# Patient Record
Sex: MALE | Race: WHITE | Employment: FULL TIME
[De-identification: names, ages, dates, MRNs, and addresses within clinical notes are randomized per-mention and may not be internally consistent; named-entity substitution may affect disease eponyms.]

---

## 2017-01-05 RX ORDER — TRAMADOL HYDROCHLORIDE 50 MG/1
TABLET ORAL
Qty: 120 TABLET | Refills: 0 | Status: SHIPPED | OUTPATIENT
Start: 2017-01-05 | End: 2017-04-10 | Stop reason: SDUPTHER

## 2017-01-09 ENCOUNTER — OFFICE VISIT (OUTPATIENT)
Dept: PODIATRY | Facility: CLINIC | Age: 42
End: 2017-01-09

## 2017-01-09 VITALS
HEIGHT: 72 IN | SYSTOLIC BLOOD PRESSURE: 142 MMHG | BODY MASS INDEX: 32.51 KG/M2 | HEART RATE: 79 BPM | DIASTOLIC BLOOD PRESSURE: 93 MMHG | WEIGHT: 240 LBS

## 2017-01-09 DIAGNOSIS — M77.50 ENTHESOPATHY OF ANKLE AND TARSUS: ICD-10-CM

## 2017-01-09 DIAGNOSIS — M25.571 SINUS TARSI SYNDROME OF RIGHT ANKLE: ICD-10-CM

## 2017-01-09 DIAGNOSIS — M72.2 PLANTAR FASCIAL FIBROMATOSIS OF LEFT FOOT: ICD-10-CM

## 2017-01-09 DIAGNOSIS — M25.572 SINUS TARSI SYNDROME OF LEFT ANKLE: ICD-10-CM

## 2017-01-09 DIAGNOSIS — M72.2 PLANTAR FASCIITIS, BILATERAL: Primary | ICD-10-CM

## 2017-01-09 DIAGNOSIS — M79.672 PAIN IN LEFT FOOT: ICD-10-CM

## 2017-01-09 PROCEDURE — 99213 OFFICE O/P EST LOW 20 MIN: CPT | Performed by: PODIATRIST

## 2017-01-09 RX ORDER — INDOMETHACIN 50 MG/1
50 CAPSULE ORAL 3 TIMES DAILY
Qty: 90 CAPSULE | Refills: 2 | Status: SHIPPED | OUTPATIENT
Start: 2017-01-09 | End: 2019-01-28 | Stop reason: ALTCHOICE

## 2017-01-09 ASSESSMENT — ENCOUNTER SYMPTOMS
CONSTIPATION: 0
VOMITING: 0
COLOR CHANGE: 0
BACK PAIN: 1
NAUSEA: 0
DIARRHEA: 0

## 2017-01-26 RX ORDER — TRAMADOL HYDROCHLORIDE 50 MG/1
50 TABLET ORAL EVERY 6 HOURS PRN
Qty: 84 TABLET | Refills: 3 | Status: SHIPPED | OUTPATIENT
Start: 2017-01-26 | End: 2017-05-01 | Stop reason: SDUPTHER

## 2017-02-24 ENCOUNTER — TELEPHONE (OUTPATIENT)
Dept: PODIATRY | Facility: CLINIC | Age: 42
End: 2017-02-24

## 2017-04-11 RX ORDER — TRAMADOL HYDROCHLORIDE 50 MG/1
50 TABLET ORAL EVERY 6 HOURS PRN
Qty: 120 TABLET | Refills: 0 | Status: SHIPPED | OUTPATIENT
Start: 2017-04-11 | End: 2017-07-06 | Stop reason: SDUPTHER

## 2017-05-04 RX ORDER — TRAMADOL HYDROCHLORIDE 50 MG/1
TABLET ORAL
Qty: 84 TABLET | Refills: 2 | Status: SHIPPED | OUTPATIENT
Start: 2017-05-04 | End: 2017-07-06 | Stop reason: SDUPTHER

## 2017-07-06 RX ORDER — TRAMADOL HYDROCHLORIDE 50 MG/1
TABLET ORAL
Qty: 84 TABLET | Refills: 1 | Status: SHIPPED | OUTPATIENT
Start: 2017-07-06 | End: 2017-08-10 | Stop reason: SDUPTHER

## 2017-08-14 RX ORDER — TRAMADOL HYDROCHLORIDE 50 MG/1
TABLET ORAL
Qty: 84 TABLET | Refills: 0 | Status: SHIPPED | OUTPATIENT
Start: 2017-08-14 | End: 2017-09-05 | Stop reason: SDUPTHER

## 2017-09-06 RX ORDER — TRAMADOL HYDROCHLORIDE 50 MG/1
TABLET ORAL
Qty: 84 TABLET | Refills: 0 | Status: SHIPPED | OUTPATIENT
Start: 2017-09-06 | End: 2017-09-25 | Stop reason: SDUPTHER

## 2017-09-26 RX ORDER — TRAMADOL HYDROCHLORIDE 50 MG/1
TABLET ORAL
Qty: 84 TABLET | Refills: 0 | Status: SHIPPED | OUTPATIENT
Start: 2017-09-26 | End: 2017-10-16 | Stop reason: SDUPTHER

## 2017-10-17 RX ORDER — TRAMADOL HYDROCHLORIDE 50 MG/1
TABLET ORAL
Qty: 84 TABLET | Refills: 0 | Status: SHIPPED | OUTPATIENT
Start: 2017-10-17 | End: 2017-11-06 | Stop reason: SDUPTHER

## 2017-11-07 RX ORDER — TRAMADOL HYDROCHLORIDE 50 MG/1
TABLET ORAL
Qty: 84 TABLET | Refills: 0 | Status: SHIPPED | OUTPATIENT
Start: 2017-11-07 | End: 2017-11-28 | Stop reason: SDUPTHER

## 2017-11-07 NOTE — TELEPHONE ENCOUNTER
Carson's wife Gabi Montero called to ask why it is taking so long for his pain medication to be approved by Dr. Kanwal Vega. Per Dr. Kanwal Vega, I told her that he can not approve this without running a report to make sure he is not getting any pain medication from anyone else. He will work on this at the end of patients today. He also said that Abby Moreno would need an appointment in January, 2018, so Gabi Montero said Abby Moreno will call to schedule one.

## 2017-11-28 RX ORDER — TRAMADOL HYDROCHLORIDE 50 MG/1
TABLET ORAL
Qty: 84 TABLET | Refills: 0 | Status: SHIPPED | OUTPATIENT
Start: 2017-11-28 | End: 2017-12-18 | Stop reason: SDUPTHER

## 2017-12-19 RX ORDER — TRAMADOL HYDROCHLORIDE 50 MG/1
TABLET ORAL
Qty: 84 TABLET | Refills: 0 | Status: SHIPPED | OUTPATIENT
Start: 2017-12-19 | End: 2018-01-10 | Stop reason: SDUPTHER

## 2018-01-10 DIAGNOSIS — M77.50 ENTHESOPATHY OF ANKLE AND TARSUS: ICD-10-CM

## 2018-01-10 DIAGNOSIS — G89.29 CHRONIC PAIN OF TOES OF BOTH FEET: ICD-10-CM

## 2018-01-10 DIAGNOSIS — M79.675 CHRONIC PAIN OF TOES OF BOTH FEET: ICD-10-CM

## 2018-01-10 DIAGNOSIS — M72.2 PLANTAR FASCIAL FIBROMATOSIS OF LEFT FOOT: ICD-10-CM

## 2018-01-10 DIAGNOSIS — M79.674 CHRONIC PAIN OF TOES OF BOTH FEET: ICD-10-CM

## 2018-01-10 DIAGNOSIS — M72.2 PLANTAR FASCIITIS, BILATERAL: Primary | ICD-10-CM

## 2018-01-11 RX ORDER — TRAMADOL HYDROCHLORIDE 50 MG/1
TABLET ORAL
Qty: 84 TABLET | Refills: 0 | Status: SHIPPED | OUTPATIENT
Start: 2018-01-11 | End: 2018-02-01 | Stop reason: SDUPTHER

## 2018-02-01 ENCOUNTER — OFFICE VISIT (OUTPATIENT)
Dept: PODIATRY | Age: 43
End: 2018-02-01
Payer: COMMERCIAL

## 2018-02-01 VITALS
HEIGHT: 72 IN | BODY MASS INDEX: 32.51 KG/M2 | HEART RATE: 73 BPM | DIASTOLIC BLOOD PRESSURE: 126 MMHG | SYSTOLIC BLOOD PRESSURE: 180 MMHG | WEIGHT: 240 LBS

## 2018-02-01 DIAGNOSIS — M25.572 SINUS TARSI SYNDROME OF LEFT ANKLE: ICD-10-CM

## 2018-02-01 DIAGNOSIS — G89.29 CHRONIC PAIN OF TOES OF BOTH FEET: ICD-10-CM

## 2018-02-01 DIAGNOSIS — M25.571 SINUS TARSI SYNDROME OF RIGHT ANKLE: ICD-10-CM

## 2018-02-01 DIAGNOSIS — M77.50 ENTHESOPATHY OF ANKLE AND TARSUS: ICD-10-CM

## 2018-02-01 DIAGNOSIS — M72.2 PLANTAR FASCIITIS, BILATERAL: Primary | ICD-10-CM

## 2018-02-01 DIAGNOSIS — M72.2 PLANTAR FASCIAL FIBROMATOSIS OF LEFT FOOT: ICD-10-CM

## 2018-02-01 DIAGNOSIS — M79.674 CHRONIC PAIN OF TOES OF BOTH FEET: ICD-10-CM

## 2018-02-01 DIAGNOSIS — M79.675 CHRONIC PAIN OF TOES OF BOTH FEET: ICD-10-CM

## 2018-02-01 PROCEDURE — 99213 OFFICE O/P EST LOW 20 MIN: CPT | Performed by: PODIATRIST

## 2018-02-01 PROCEDURE — 20550 NJX 1 TENDON SHEATH/LIGAMENT: CPT | Performed by: PODIATRIST

## 2018-02-01 RX ORDER — TESTOSTERONE CYPIONATE 200 MG/ML
6 INJECTION INTRAMUSCULAR ONCE
Status: COMPLETED | OUTPATIENT
Start: 2018-02-01 | End: 2018-02-01

## 2018-02-01 RX ORDER — TRAMADOL HYDROCHLORIDE 50 MG/1
TABLET ORAL
Qty: 84 TABLET | Refills: 0 | Status: SHIPPED | OUTPATIENT
Start: 2018-02-01 | End: 2018-02-20 | Stop reason: SDUPTHER

## 2018-02-01 RX ORDER — BUPIVACAINE HYDROCHLORIDE 5 MG/ML
1 INJECTION, SOLUTION PERINEURAL ONCE
Status: COMPLETED | OUTPATIENT
Start: 2018-02-01 | End: 2018-02-01

## 2018-02-01 RX ADMIN — TESTOSTERONE CYPIONATE 6 MG: 200 INJECTION INTRAMUSCULAR at 17:45

## 2018-02-01 RX ADMIN — BUPIVACAINE HYDROCHLORIDE 5 MG: 5 INJECTION, SOLUTION PERINEURAL at 17:45

## 2018-02-01 ASSESSMENT — ENCOUNTER SYMPTOMS
VOMITING: 0
COLOR CHANGE: 0
CONSTIPATION: 0
BACK PAIN: 1
DIARRHEA: 0
NAUSEA: 0

## 2018-02-01 NOTE — PROGRESS NOTES
[facility]  Return Patient     Cash Winter 43 y.o. male that presents for follow-up of   Chief Complaint   Patient presents with    Foot Pain     reassess b/l feet, still having pain      Most of his pain today is in the ankle, side area, lateral ankle. Worse in the left plantar arch, where he had a fibroma. Noticed a bump now on the right. He is taking Ultram, not wearing orthotics-they became painful. .    He has a history of severe foot and ankle pain. He used to wear orthotics everyday and he tries to get new boots. He has been icing. No injury. He works on ballast and uneven surfaces. Currently denies F/C/N/V. No Known Allergies    No past medical history on file. Prior to Admission medications    Medication Sig Start Date End Date Taking? Authorizing Provider   traMADol (ULTRAM) 50 MG tablet TAKE ONE TABLET BY MOUTH EVERY 6 HOURS AS NEEDED FOR PAIN. 2/1/18 3/4/18 Yes Pita Menon, RENETTA   indomethacin (INDOCIN) 50 MG capsule Take 1 capsule by mouth 3 times daily 1/9/17  Yes Mirian Munoz DPM   amLODIPine (NORVASC) 5 MG tablet Take 1 tablet by mouth daily. 1/26/15  Yes Vinod Alva MD       Review of Systems   Constitutional: Negative for chills, diaphoresis, fatigue, fever and unexpected weight change. Cardiovascular: Negative for leg swelling. Gastrointestinal: Negative for constipation, diarrhea, nausea and vomiting. Musculoskeletal: Positive for back pain. Negative for arthralgias, gait problem and joint swelling. Skin: Negative for color change, pallor, rash and wound. Neurological: Negative for weakness and numbness. Lower Extremity Physical Examination:     Vitals:   Vitals:    02/01/18 1622   BP: (!) 180/126   Pulse: 73     General: AAO x 3 in NAD.      Integument:   Warm, dry, supple, Bilateral.  Open lesion absent, Bilateral.      Vascular: DP and PT pulses palpable 2/4, Bilateral.  CFT <3 seconds, Bilateral.  Hair growth present to the level of the digits, Bilateral.  Edema absent, Bilateral.  Varicosities absent, Bilateral. Erythema absent, Bilateral    Neurological:  Sensation present to light touch to level of digits, Bilateral.      Musculoskeletal: Muscle strength 5/5, Bilateral.  Pain present upon palpation of central left arch with a palpable nodule within the plantar fascia, Left, small nodule noted on the right, minimal pain. Ludger Hare decreased medial longitudinal arch, Bilateral.  Ankle ROM normal,Bilateral.  Pain bilateral sinus tarsi. Radiographs: 3 views left Foot:  Decreased medial arch height. No acute pathology. Asessment: Patient is a 43 y.o. male with:   1. Plantar fasciitis, bilateral    2. Enthesopathy of ankle and tarsus    3. Plantar fascial fibromatosis of left foot    4. Sinus tarsi syndrome of right ankle    5. Sinus tarsi syndrome of left ankle    6. Chronic pain of toes of both feet        Plan: Patient examined and evaluated. Current condition and treatment options discussed in detail. Advised pt to continue with orthotics and new boots. Verbal and written instructions given to patient. Contact office with any questions/problems/concerns. 1) Ice, massage, orthotics. 2) continue Ultram  3) Injection Type:  Trigger point/Ligament    Anatomic Location: left central arch, fibroma    Verbal consent obtained from patient for injection after all questions answered. Left foot palpated and most tender location identified. This area was prepped with an alcohol swab and 3 cc total of 1cc 0.5% marcaine plain, 1 cc dexamethasone phosphate, and 1 cc depo medrol was injected to the left. A band-aid was applied, patient advised of possible local steroid reaction symptoms, and patient instructed to limit activities to this area for 24 hours. Medications ordered during this encounter were injected into the left.      Orders Placed This Encounter   Procedures    [place] INJECT TENDON SHEATH/LIGAMENT     Orders Placed This

## 2018-02-06 DIAGNOSIS — L74.513 SWEATY FEET: Primary | ICD-10-CM

## 2018-02-06 RX ORDER — ERYTHROMYCIN 20 MG/G
GEL TOPICAL
Qty: 1 TUBE | Refills: 3 | Status: SHIPPED | OUTPATIENT
Start: 2018-02-06 | End: 2019-01-28

## 2018-02-20 DIAGNOSIS — M79.674 CHRONIC PAIN OF TOES OF BOTH FEET: ICD-10-CM

## 2018-02-20 DIAGNOSIS — G89.29 CHRONIC PAIN OF TOES OF BOTH FEET: ICD-10-CM

## 2018-02-20 DIAGNOSIS — M25.571 SINUS TARSI SYNDROME OF RIGHT ANKLE: ICD-10-CM

## 2018-02-20 DIAGNOSIS — M77.50 ENTHESOPATHY OF ANKLE AND TARSUS: ICD-10-CM

## 2018-02-20 DIAGNOSIS — M72.2 PLANTAR FASCIITIS, BILATERAL: ICD-10-CM

## 2018-02-20 DIAGNOSIS — M25.572 SINUS TARSI SYNDROME OF LEFT ANKLE: ICD-10-CM

## 2018-02-20 DIAGNOSIS — M72.2 PLANTAR FASCIAL FIBROMATOSIS OF LEFT FOOT: ICD-10-CM

## 2018-02-20 DIAGNOSIS — M79.675 CHRONIC PAIN OF TOES OF BOTH FEET: ICD-10-CM

## 2018-02-20 RX ORDER — TRAMADOL HYDROCHLORIDE 50 MG/1
TABLET ORAL
Qty: 84 TABLET | Refills: 0 | Status: SHIPPED | OUTPATIENT
Start: 2018-02-20 | End: 2018-03-12 | Stop reason: SDUPTHER

## 2018-03-12 DIAGNOSIS — M79.674 CHRONIC PAIN OF TOES OF BOTH FEET: ICD-10-CM

## 2018-03-12 DIAGNOSIS — M79.675 CHRONIC PAIN OF TOES OF BOTH FEET: ICD-10-CM

## 2018-03-12 DIAGNOSIS — M77.50 ENTHESOPATHY OF ANKLE AND TARSUS: ICD-10-CM

## 2018-03-12 DIAGNOSIS — M25.572 SINUS TARSI SYNDROME OF LEFT ANKLE: ICD-10-CM

## 2018-03-12 DIAGNOSIS — G89.29 CHRONIC PAIN OF TOES OF BOTH FEET: ICD-10-CM

## 2018-03-12 DIAGNOSIS — M25.571 SINUS TARSI SYNDROME OF RIGHT ANKLE: ICD-10-CM

## 2018-03-12 DIAGNOSIS — M72.2 PLANTAR FASCIAL FIBROMATOSIS OF LEFT FOOT: ICD-10-CM

## 2018-03-12 DIAGNOSIS — M72.2 PLANTAR FASCIITIS, BILATERAL: ICD-10-CM

## 2018-03-14 RX ORDER — TRAMADOL HYDROCHLORIDE 50 MG/1
TABLET ORAL
Qty: 84 TABLET | Refills: 0 | Status: SHIPPED | OUTPATIENT
Start: 2018-03-14 | End: 2018-04-05 | Stop reason: SDUPTHER

## 2018-04-27 ENCOUNTER — TELEPHONE (OUTPATIENT)
Dept: PODIATRY | Age: 43
End: 2018-04-27

## 2018-04-27 DIAGNOSIS — M72.2 PLANTAR FASCIAL FIBROMATOSIS OF LEFT FOOT: ICD-10-CM

## 2018-04-27 DIAGNOSIS — M79.675 CHRONIC PAIN OF TOES OF BOTH FEET: ICD-10-CM

## 2018-04-27 DIAGNOSIS — M25.571 SINUS TARSI SYNDROME OF RIGHT ANKLE: ICD-10-CM

## 2018-04-27 DIAGNOSIS — M79.674 CHRONIC PAIN OF TOES OF BOTH FEET: ICD-10-CM

## 2018-04-27 DIAGNOSIS — M77.50 ENTHESOPATHY OF ANKLE AND TARSUS: ICD-10-CM

## 2018-04-27 DIAGNOSIS — M72.2 PLANTAR FASCIITIS, BILATERAL: ICD-10-CM

## 2018-04-27 DIAGNOSIS — M25.572 SINUS TARSI SYNDROME OF LEFT ANKLE: ICD-10-CM

## 2018-04-27 DIAGNOSIS — G89.29 CHRONIC PAIN OF TOES OF BOTH FEET: ICD-10-CM

## 2018-04-30 RX ORDER — TRAMADOL HYDROCHLORIDE 50 MG/1
50 TABLET ORAL EVERY 6 HOURS PRN
Qty: 84 TABLET | Refills: 0 | Status: SHIPPED | OUTPATIENT
Start: 2018-04-30 | End: 2018-05-21 | Stop reason: SDUPTHER

## 2018-05-21 DIAGNOSIS — M72.2 PLANTAR FASCIAL FIBROMATOSIS OF LEFT FOOT: ICD-10-CM

## 2018-05-21 DIAGNOSIS — M25.572 SINUS TARSI SYNDROME OF LEFT ANKLE: ICD-10-CM

## 2018-05-21 DIAGNOSIS — M72.2 PLANTAR FASCIITIS, BILATERAL: ICD-10-CM

## 2018-05-21 DIAGNOSIS — M25.571 SINUS TARSI SYNDROME OF RIGHT ANKLE: ICD-10-CM

## 2018-05-21 DIAGNOSIS — M77.50 ENTHESOPATHY OF ANKLE AND TARSUS: ICD-10-CM

## 2018-05-21 DIAGNOSIS — M79.674 CHRONIC PAIN OF TOES OF BOTH FEET: ICD-10-CM

## 2018-05-21 DIAGNOSIS — G89.29 CHRONIC PAIN OF TOES OF BOTH FEET: ICD-10-CM

## 2018-05-21 DIAGNOSIS — M79.675 CHRONIC PAIN OF TOES OF BOTH FEET: ICD-10-CM

## 2018-05-23 RX ORDER — TRAMADOL HYDROCHLORIDE 50 MG/1
TABLET ORAL
Qty: 84 TABLET | Refills: 0 | Status: SHIPPED | OUTPATIENT
Start: 2018-05-23 | End: 2018-06-11 | Stop reason: SDUPTHER

## 2018-06-11 DIAGNOSIS — G89.29 CHRONIC PAIN OF TOES OF BOTH FEET: ICD-10-CM

## 2018-06-11 DIAGNOSIS — M77.50 ENTHESOPATHY OF ANKLE AND TARSUS: ICD-10-CM

## 2018-06-11 DIAGNOSIS — M25.572 SINUS TARSI SYNDROME OF LEFT ANKLE: ICD-10-CM

## 2018-06-11 DIAGNOSIS — M79.675 CHRONIC PAIN OF TOES OF BOTH FEET: ICD-10-CM

## 2018-06-11 DIAGNOSIS — M79.674 CHRONIC PAIN OF TOES OF BOTH FEET: ICD-10-CM

## 2018-06-11 DIAGNOSIS — M25.571 SINUS TARSI SYNDROME OF RIGHT ANKLE: ICD-10-CM

## 2018-06-11 DIAGNOSIS — M72.2 PLANTAR FASCIAL FIBROMATOSIS OF LEFT FOOT: ICD-10-CM

## 2018-06-11 DIAGNOSIS — M72.2 PLANTAR FASCIITIS, BILATERAL: ICD-10-CM

## 2018-06-11 RX ORDER — TRAMADOL HYDROCHLORIDE 50 MG/1
TABLET ORAL
Qty: 84 TABLET | Refills: 0 | Status: SHIPPED | OUTPATIENT
Start: 2018-06-11 | End: 2018-07-06 | Stop reason: SDUPTHER

## 2018-07-06 DIAGNOSIS — M25.572 SINUS TARSI SYNDROME OF LEFT ANKLE: ICD-10-CM

## 2018-07-06 DIAGNOSIS — G89.29 CHRONIC PAIN OF TOES OF BOTH FEET: ICD-10-CM

## 2018-07-06 DIAGNOSIS — M77.50 ENTHESOPATHY OF ANKLE AND TARSUS: ICD-10-CM

## 2018-07-06 DIAGNOSIS — M72.2 PLANTAR FASCIITIS, BILATERAL: ICD-10-CM

## 2018-07-06 DIAGNOSIS — M25.571 SINUS TARSI SYNDROME OF RIGHT ANKLE: ICD-10-CM

## 2018-07-06 DIAGNOSIS — M72.2 PLANTAR FASCIAL FIBROMATOSIS OF LEFT FOOT: ICD-10-CM

## 2018-07-06 DIAGNOSIS — M79.675 CHRONIC PAIN OF TOES OF BOTH FEET: ICD-10-CM

## 2018-07-06 DIAGNOSIS — M79.674 CHRONIC PAIN OF TOES OF BOTH FEET: ICD-10-CM

## 2018-07-09 RX ORDER — TRAMADOL HYDROCHLORIDE 50 MG/1
TABLET ORAL
Qty: 84 TABLET | Refills: 0 | Status: SHIPPED | OUTPATIENT
Start: 2018-07-09 | End: 2018-07-31 | Stop reason: SDUPTHER

## 2018-07-31 DIAGNOSIS — M25.571 SINUS TARSI SYNDROME OF RIGHT ANKLE: ICD-10-CM

## 2018-07-31 DIAGNOSIS — M79.675 CHRONIC PAIN OF TOES OF BOTH FEET: ICD-10-CM

## 2018-07-31 DIAGNOSIS — M79.674 CHRONIC PAIN OF TOES OF BOTH FEET: ICD-10-CM

## 2018-07-31 DIAGNOSIS — M77.50 ENTHESOPATHY OF ANKLE AND TARSUS: ICD-10-CM

## 2018-07-31 DIAGNOSIS — M72.2 PLANTAR FASCIAL FIBROMATOSIS OF LEFT FOOT: ICD-10-CM

## 2018-07-31 DIAGNOSIS — G89.29 CHRONIC PAIN OF TOES OF BOTH FEET: ICD-10-CM

## 2018-07-31 DIAGNOSIS — M72.2 PLANTAR FASCIITIS, BILATERAL: ICD-10-CM

## 2018-07-31 DIAGNOSIS — M25.572 SINUS TARSI SYNDROME OF LEFT ANKLE: ICD-10-CM

## 2018-08-01 RX ORDER — TRAMADOL HYDROCHLORIDE 50 MG/1
TABLET ORAL
Qty: 84 TABLET | Refills: 0 | Status: SHIPPED | OUTPATIENT
Start: 2018-08-01 | End: 2018-08-21 | Stop reason: SDUPTHER

## 2018-08-21 DIAGNOSIS — M25.572 SINUS TARSI SYNDROME OF LEFT ANKLE: ICD-10-CM

## 2018-08-21 DIAGNOSIS — M77.50 ENTHESOPATHY OF ANKLE AND TARSUS: ICD-10-CM

## 2018-08-21 DIAGNOSIS — M72.2 PLANTAR FASCIITIS, BILATERAL: ICD-10-CM

## 2018-08-21 DIAGNOSIS — M72.2 PLANTAR FASCIAL FIBROMATOSIS OF LEFT FOOT: ICD-10-CM

## 2018-08-21 DIAGNOSIS — M79.674 CHRONIC PAIN OF TOES OF BOTH FEET: ICD-10-CM

## 2018-08-21 DIAGNOSIS — M79.675 CHRONIC PAIN OF TOES OF BOTH FEET: ICD-10-CM

## 2018-08-21 DIAGNOSIS — M25.571 SINUS TARSI SYNDROME OF RIGHT ANKLE: ICD-10-CM

## 2018-08-21 DIAGNOSIS — G89.29 CHRONIC PAIN OF TOES OF BOTH FEET: ICD-10-CM

## 2018-08-23 RX ORDER — TRAMADOL HYDROCHLORIDE 50 MG/1
TABLET ORAL
Qty: 84 TABLET | Refills: 0 | Status: SHIPPED | OUTPATIENT
Start: 2018-08-23 | End: 2018-09-10 | Stop reason: SDUPTHER

## 2018-09-10 DIAGNOSIS — M72.2 PLANTAR FASCIAL FIBROMATOSIS OF LEFT FOOT: ICD-10-CM

## 2018-09-10 DIAGNOSIS — M25.571 SINUS TARSI SYNDROME OF RIGHT ANKLE: ICD-10-CM

## 2018-09-10 DIAGNOSIS — M79.675 CHRONIC PAIN OF TOES OF BOTH FEET: ICD-10-CM

## 2018-09-10 DIAGNOSIS — M79.674 CHRONIC PAIN OF TOES OF BOTH FEET: ICD-10-CM

## 2018-09-10 DIAGNOSIS — M25.572 SINUS TARSI SYNDROME OF LEFT ANKLE: ICD-10-CM

## 2018-09-10 DIAGNOSIS — M72.2 PLANTAR FASCIITIS, BILATERAL: ICD-10-CM

## 2018-09-10 DIAGNOSIS — M77.50 ENTHESOPATHY OF ANKLE AND TARSUS: ICD-10-CM

## 2018-09-10 DIAGNOSIS — G89.29 CHRONIC PAIN OF TOES OF BOTH FEET: ICD-10-CM

## 2018-09-17 RX ORDER — TRAMADOL HYDROCHLORIDE 50 MG/1
TABLET ORAL
Qty: 84 TABLET | Refills: 0 | Status: SHIPPED | OUTPATIENT
Start: 2018-09-17 | End: 2018-10-09 | Stop reason: SDUPTHER

## 2018-10-09 DIAGNOSIS — M79.675 CHRONIC PAIN OF TOES OF BOTH FEET: ICD-10-CM

## 2018-10-09 DIAGNOSIS — M25.572 SINUS TARSI SYNDROME OF LEFT ANKLE: ICD-10-CM

## 2018-10-09 DIAGNOSIS — M25.571 SINUS TARSI SYNDROME OF RIGHT ANKLE: ICD-10-CM

## 2018-10-09 DIAGNOSIS — M77.50 ENTHESOPATHY OF ANKLE AND TARSUS: ICD-10-CM

## 2018-10-09 DIAGNOSIS — G89.29 CHRONIC PAIN OF TOES OF BOTH FEET: ICD-10-CM

## 2018-10-09 DIAGNOSIS — M72.2 PLANTAR FASCIITIS, BILATERAL: ICD-10-CM

## 2018-10-09 DIAGNOSIS — M79.674 CHRONIC PAIN OF TOES OF BOTH FEET: ICD-10-CM

## 2018-10-09 DIAGNOSIS — M72.2 PLANTAR FASCIAL FIBROMATOSIS OF LEFT FOOT: ICD-10-CM

## 2018-10-11 RX ORDER — TRAMADOL HYDROCHLORIDE 50 MG/1
TABLET ORAL
Qty: 84 TABLET | Refills: 0 | Status: SHIPPED | OUTPATIENT
Start: 2018-10-11 | End: 2018-10-31 | Stop reason: SDUPTHER

## 2018-10-31 DIAGNOSIS — M25.572 SINUS TARSI SYNDROME OF LEFT ANKLE: ICD-10-CM

## 2018-10-31 DIAGNOSIS — M25.571 SINUS TARSI SYNDROME OF RIGHT ANKLE: ICD-10-CM

## 2018-10-31 DIAGNOSIS — M79.675 CHRONIC PAIN OF TOES OF BOTH FEET: ICD-10-CM

## 2018-10-31 DIAGNOSIS — G89.29 CHRONIC PAIN OF TOES OF BOTH FEET: ICD-10-CM

## 2018-10-31 DIAGNOSIS — M72.2 PLANTAR FASCIITIS, BILATERAL: ICD-10-CM

## 2018-10-31 DIAGNOSIS — M72.2 PLANTAR FASCIAL FIBROMATOSIS OF LEFT FOOT: ICD-10-CM

## 2018-10-31 DIAGNOSIS — M77.50 ENTHESOPATHY OF ANKLE AND TARSUS: ICD-10-CM

## 2018-10-31 DIAGNOSIS — M79.674 CHRONIC PAIN OF TOES OF BOTH FEET: ICD-10-CM

## 2018-11-01 RX ORDER — TRAMADOL HYDROCHLORIDE 50 MG/1
TABLET ORAL
Qty: 84 TABLET | Refills: 0 | Status: SHIPPED | OUTPATIENT
Start: 2018-11-01 | End: 2018-11-26 | Stop reason: SDUPTHER

## 2018-12-17 ENCOUNTER — TELEPHONE (OUTPATIENT)
Dept: PODIATRY | Age: 43
End: 2018-12-17

## 2018-12-17 DIAGNOSIS — M72.2 PLANTAR FASCIITIS, BILATERAL: ICD-10-CM

## 2018-12-17 DIAGNOSIS — M79.675 CHRONIC PAIN OF TOES OF BOTH FEET: ICD-10-CM

## 2018-12-17 DIAGNOSIS — G89.29 CHRONIC PAIN OF TOES OF BOTH FEET: ICD-10-CM

## 2018-12-17 DIAGNOSIS — M79.674 CHRONIC PAIN OF TOES OF BOTH FEET: ICD-10-CM

## 2018-12-17 DIAGNOSIS — M25.572 SINUS TARSI SYNDROME OF LEFT ANKLE: ICD-10-CM

## 2018-12-17 DIAGNOSIS — M77.50 ENTHESOPATHY OF ANKLE AND TARSUS: ICD-10-CM

## 2018-12-17 DIAGNOSIS — M25.571 SINUS TARSI SYNDROME OF RIGHT ANKLE: ICD-10-CM

## 2018-12-17 DIAGNOSIS — M72.2 PLANTAR FASCIAL FIBROMATOSIS OF LEFT FOOT: ICD-10-CM

## 2018-12-19 RX ORDER — TRAMADOL HYDROCHLORIDE 50 MG/1
50 TABLET ORAL EVERY 8 HOURS PRN
Qty: 84 TABLET | Refills: 0 | Status: SHIPPED | OUTPATIENT
Start: 2018-12-19 | End: 2019-01-18

## 2019-01-09 DIAGNOSIS — M25.572 SINUS TARSI SYNDROME OF LEFT ANKLE: ICD-10-CM

## 2019-01-09 DIAGNOSIS — G89.29 CHRONIC PAIN OF TOES OF BOTH FEET: ICD-10-CM

## 2019-01-09 DIAGNOSIS — M77.50 ENTHESOPATHY OF ANKLE AND TARSUS: ICD-10-CM

## 2019-01-09 DIAGNOSIS — M72.2 PLANTAR FASCIAL FIBROMATOSIS OF LEFT FOOT: ICD-10-CM

## 2019-01-09 DIAGNOSIS — M72.2 PLANTAR FASCIITIS, BILATERAL: ICD-10-CM

## 2019-01-09 DIAGNOSIS — M25.571 SINUS TARSI SYNDROME OF RIGHT ANKLE: ICD-10-CM

## 2019-01-09 DIAGNOSIS — M79.675 CHRONIC PAIN OF TOES OF BOTH FEET: ICD-10-CM

## 2019-01-09 DIAGNOSIS — M79.674 CHRONIC PAIN OF TOES OF BOTH FEET: ICD-10-CM

## 2019-01-14 RX ORDER — TRAMADOL HYDROCHLORIDE 50 MG/1
TABLET ORAL
Qty: 84 TABLET | Refills: 0 | Status: SHIPPED | OUTPATIENT
Start: 2019-01-14 | End: 2019-01-28 | Stop reason: SDUPTHER

## 2019-01-28 ENCOUNTER — OFFICE VISIT (OUTPATIENT)
Dept: PODIATRY | Age: 44
End: 2019-01-28
Payer: COMMERCIAL

## 2019-01-28 VITALS — BODY MASS INDEX: 32.51 KG/M2 | HEIGHT: 72 IN | WEIGHT: 240 LBS

## 2019-01-28 DIAGNOSIS — M72.2 PLANTAR FASCIITIS, BILATERAL: Primary | ICD-10-CM

## 2019-01-28 DIAGNOSIS — M72.2 PLANTAR FASCIAL FIBROMATOSIS OF LEFT FOOT: ICD-10-CM

## 2019-01-28 DIAGNOSIS — M25.572 SINUS TARSI SYNDROME OF LEFT ANKLE: ICD-10-CM

## 2019-01-28 DIAGNOSIS — G89.29 CHRONIC PAIN OF TOES OF BOTH FEET: ICD-10-CM

## 2019-01-28 DIAGNOSIS — M25.571 SINUS TARSI SYNDROME OF RIGHT ANKLE: ICD-10-CM

## 2019-01-28 DIAGNOSIS — M79.675 CHRONIC PAIN OF TOES OF BOTH FEET: ICD-10-CM

## 2019-01-28 DIAGNOSIS — M79.674 CHRONIC PAIN OF TOES OF BOTH FEET: ICD-10-CM

## 2019-01-28 DIAGNOSIS — M77.50 ENTHESOPATHY OF ANKLE AND TARSUS: ICD-10-CM

## 2019-01-28 PROCEDURE — 20605 DRAIN/INJ JOINT/BURSA W/O US: CPT | Performed by: PODIATRIST

## 2019-01-28 PROCEDURE — 99213 OFFICE O/P EST LOW 20 MIN: CPT | Performed by: PODIATRIST

## 2019-01-28 RX ORDER — BUPIVACAINE HYDROCHLORIDE 2.5 MG/ML
1 INJECTION, SOLUTION INFILTRATION; PERINEURAL ONCE
Status: COMPLETED | OUTPATIENT
Start: 2019-01-28 | End: 2019-01-28

## 2019-01-28 RX ORDER — TRAMADOL HYDROCHLORIDE 50 MG/1
50 TABLET ORAL EVERY 6 HOURS PRN
Qty: 84 TABLET | Refills: 0 | Status: SHIPPED | OUTPATIENT
Start: 2019-01-28 | End: 2019-02-25 | Stop reason: SDUPTHER

## 2019-01-28 RX ORDER — TESTOSTERONE CYPIONATE 200 MG/ML
6 INJECTION INTRAMUSCULAR ONCE
Status: COMPLETED | OUTPATIENT
Start: 2019-01-28 | End: 2019-01-28

## 2019-01-28 RX ORDER — TRAMADOL HYDROCHLORIDE 50 MG/1
50 TABLET ORAL EVERY 6 HOURS PRN
Qty: 84 TABLET | Refills: 0 | Status: SHIPPED | OUTPATIENT
Start: 2019-01-28 | End: 2019-01-28 | Stop reason: SDUPTHER

## 2019-01-28 RX ADMIN — TESTOSTERONE CYPIONATE 6 MG: 200 INJECTION INTRAMUSCULAR at 17:30

## 2019-01-28 RX ADMIN — BUPIVACAINE HYDROCHLORIDE 2.5 MG: 2.5 INJECTION, SOLUTION INFILTRATION; PERINEURAL at 17:30

## 2019-01-28 ASSESSMENT — ENCOUNTER SYMPTOMS
VOMITING: 0
DIARRHEA: 0
CONSTIPATION: 0
NAUSEA: 0
COLOR CHANGE: 0
BACK PAIN: 1

## 2019-02-04 DIAGNOSIS — G89.29 CHRONIC PAIN OF TOES OF BOTH FEET: ICD-10-CM

## 2019-02-04 DIAGNOSIS — M72.2 PLANTAR FASCIAL FIBROMATOSIS OF LEFT FOOT: ICD-10-CM

## 2019-02-04 DIAGNOSIS — M79.675 CHRONIC PAIN OF TOES OF BOTH FEET: ICD-10-CM

## 2019-02-04 DIAGNOSIS — M25.572 SINUS TARSI SYNDROME OF LEFT ANKLE: ICD-10-CM

## 2019-02-04 DIAGNOSIS — M72.2 PLANTAR FASCIITIS, BILATERAL: ICD-10-CM

## 2019-02-04 DIAGNOSIS — M25.571 SINUS TARSI SYNDROME OF RIGHT ANKLE: ICD-10-CM

## 2019-02-04 DIAGNOSIS — M79.674 CHRONIC PAIN OF TOES OF BOTH FEET: ICD-10-CM

## 2019-02-04 DIAGNOSIS — M77.50 ENTHESOPATHY OF ANKLE AND TARSUS: ICD-10-CM

## 2019-02-11 RX ORDER — TRAMADOL HYDROCHLORIDE 50 MG/1
50 TABLET ORAL EVERY 6 HOURS PRN
Qty: 84 TABLET | Refills: 0 | OUTPATIENT
Start: 2019-02-11 | End: 2019-03-13

## 2019-02-25 DIAGNOSIS — G89.29 CHRONIC PAIN OF TOES OF BOTH FEET: ICD-10-CM

## 2019-02-25 DIAGNOSIS — M25.572 SINUS TARSI SYNDROME OF LEFT ANKLE: ICD-10-CM

## 2019-02-25 DIAGNOSIS — M79.675 CHRONIC PAIN OF TOES OF BOTH FEET: ICD-10-CM

## 2019-02-25 DIAGNOSIS — M25.571 SINUS TARSI SYNDROME OF RIGHT ANKLE: ICD-10-CM

## 2019-02-25 DIAGNOSIS — M79.674 CHRONIC PAIN OF TOES OF BOTH FEET: ICD-10-CM

## 2019-02-25 DIAGNOSIS — M77.50 ENTHESOPATHY OF ANKLE AND TARSUS: ICD-10-CM

## 2019-02-25 DIAGNOSIS — M72.2 PLANTAR FASCIAL FIBROMATOSIS OF LEFT FOOT: ICD-10-CM

## 2019-02-25 DIAGNOSIS — M72.2 PLANTAR FASCIITIS, BILATERAL: ICD-10-CM

## 2019-02-27 RX ORDER — TRAMADOL HYDROCHLORIDE 50 MG/1
TABLET ORAL
Qty: 84 TABLET | Refills: 0 | Status: SHIPPED | OUTPATIENT
Start: 2019-02-27 | End: 2019-03-19 | Stop reason: SDUPTHER

## 2019-03-19 DIAGNOSIS — M77.50 ENTHESOPATHY OF ANKLE AND TARSUS: ICD-10-CM

## 2019-03-19 DIAGNOSIS — M25.572 SINUS TARSI SYNDROME OF LEFT ANKLE: ICD-10-CM

## 2019-03-19 DIAGNOSIS — M72.2 PLANTAR FASCIAL FIBROMATOSIS OF LEFT FOOT: ICD-10-CM

## 2019-03-19 DIAGNOSIS — G89.29 CHRONIC PAIN OF TOES OF BOTH FEET: ICD-10-CM

## 2019-03-19 DIAGNOSIS — M79.675 CHRONIC PAIN OF TOES OF BOTH FEET: ICD-10-CM

## 2019-03-19 DIAGNOSIS — M25.571 SINUS TARSI SYNDROME OF RIGHT ANKLE: ICD-10-CM

## 2019-03-19 DIAGNOSIS — M72.2 PLANTAR FASCIITIS, BILATERAL: ICD-10-CM

## 2019-03-19 DIAGNOSIS — M79.674 CHRONIC PAIN OF TOES OF BOTH FEET: ICD-10-CM

## 2019-03-20 RX ORDER — TRAMADOL HYDROCHLORIDE 50 MG/1
TABLET ORAL
Qty: 84 TABLET | Refills: 0 | Status: SHIPPED | OUTPATIENT
Start: 2019-03-20 | End: 2019-04-09 | Stop reason: SDUPTHER

## 2019-04-09 DIAGNOSIS — M79.675 CHRONIC PAIN OF TOES OF BOTH FEET: ICD-10-CM

## 2019-04-09 DIAGNOSIS — M72.2 PLANTAR FASCIITIS, BILATERAL: ICD-10-CM

## 2019-04-09 DIAGNOSIS — M25.571 SINUS TARSI SYNDROME OF RIGHT ANKLE: ICD-10-CM

## 2019-04-09 DIAGNOSIS — M72.2 PLANTAR FASCIAL FIBROMATOSIS OF LEFT FOOT: ICD-10-CM

## 2019-04-09 DIAGNOSIS — M79.674 CHRONIC PAIN OF TOES OF BOTH FEET: ICD-10-CM

## 2019-04-09 DIAGNOSIS — M25.572 SINUS TARSI SYNDROME OF LEFT ANKLE: ICD-10-CM

## 2019-04-09 DIAGNOSIS — M77.50 ENTHESOPATHY OF ANKLE AND TARSUS: ICD-10-CM

## 2019-04-09 DIAGNOSIS — G89.29 CHRONIC PAIN OF TOES OF BOTH FEET: ICD-10-CM

## 2019-04-11 NOTE — TELEPHONE ENCOUNTER
Please Approve or Refuse.   Send to Pharmacy per Pt's Request:     Next Visit Date:  Visit date not found   Last Visit Date: 1/28/2019    No results found for: LABA1C          ( goal A1C is < 7)   BP Readings from Last 3 Encounters:   02/01/18 (!) 180/126   01/09/17 (!) 142/93   10/03/16 (!) 139/96          (goal 120/80)  BUN   Date Value Ref Range Status   01/30/2015 14 6 - 20 mg/dL Final     CREATININE   Date Value Ref Range Status   01/30/2015 0.71 0.70 - 1.20 mg/dL Final     Potassium   Date Value Ref Range Status   01/30/2015 3.7 3.7 - 5.3 mmol/L Final

## 2019-04-12 RX ORDER — TRAMADOL HYDROCHLORIDE 50 MG/1
TABLET ORAL
Qty: 84 TABLET | Refills: 0 | Status: SHIPPED | OUTPATIENT
Start: 2019-04-12 | End: 2019-04-30 | Stop reason: SDUPTHER

## 2019-04-30 DIAGNOSIS — M77.50 ENTHESOPATHY OF ANKLE AND TARSUS: ICD-10-CM

## 2019-04-30 DIAGNOSIS — M72.2 PLANTAR FASCIAL FIBROMATOSIS OF LEFT FOOT: ICD-10-CM

## 2019-04-30 DIAGNOSIS — M25.572 SINUS TARSI SYNDROME OF LEFT ANKLE: ICD-10-CM

## 2019-04-30 DIAGNOSIS — G89.29 CHRONIC PAIN OF TOES OF BOTH FEET: ICD-10-CM

## 2019-04-30 DIAGNOSIS — M25.571 SINUS TARSI SYNDROME OF RIGHT ANKLE: ICD-10-CM

## 2019-04-30 DIAGNOSIS — M79.675 CHRONIC PAIN OF TOES OF BOTH FEET: ICD-10-CM

## 2019-04-30 DIAGNOSIS — M79.674 CHRONIC PAIN OF TOES OF BOTH FEET: ICD-10-CM

## 2019-04-30 DIAGNOSIS — M72.2 PLANTAR FASCIITIS, BILATERAL: ICD-10-CM

## 2019-05-01 RX ORDER — TRAMADOL HYDROCHLORIDE 50 MG/1
TABLET ORAL
Qty: 84 TABLET | Refills: 0 | Status: SHIPPED | OUTPATIENT
Start: 2019-05-01 | End: 2019-05-20 | Stop reason: SDUPTHER

## 2019-05-20 DIAGNOSIS — M79.675 CHRONIC PAIN OF TOES OF BOTH FEET: ICD-10-CM

## 2019-05-20 DIAGNOSIS — M72.2 PLANTAR FASCIITIS, BILATERAL: ICD-10-CM

## 2019-05-20 DIAGNOSIS — G89.29 CHRONIC PAIN OF TOES OF BOTH FEET: ICD-10-CM

## 2019-05-20 DIAGNOSIS — M72.2 PLANTAR FASCIAL FIBROMATOSIS OF LEFT FOOT: ICD-10-CM

## 2019-05-20 DIAGNOSIS — M77.50 ENTHESOPATHY OF ANKLE AND TARSUS: ICD-10-CM

## 2019-05-20 DIAGNOSIS — M25.572 SINUS TARSI SYNDROME OF LEFT ANKLE: ICD-10-CM

## 2019-05-20 DIAGNOSIS — M25.571 SINUS TARSI SYNDROME OF RIGHT ANKLE: ICD-10-CM

## 2019-05-20 DIAGNOSIS — M79.674 CHRONIC PAIN OF TOES OF BOTH FEET: ICD-10-CM

## 2019-05-22 RX ORDER — TRAMADOL HYDROCHLORIDE 50 MG/1
TABLET ORAL
Qty: 84 TABLET | Refills: 0 | Status: SHIPPED | OUTPATIENT
Start: 2019-05-22 | End: 2019-06-10 | Stop reason: SDUPTHER

## 2019-06-10 DIAGNOSIS — M25.572 SINUS TARSI SYNDROME OF LEFT ANKLE: ICD-10-CM

## 2019-06-10 DIAGNOSIS — M79.674 CHRONIC PAIN OF TOES OF BOTH FEET: ICD-10-CM

## 2019-06-10 DIAGNOSIS — M25.571 SINUS TARSI SYNDROME OF RIGHT ANKLE: ICD-10-CM

## 2019-06-10 DIAGNOSIS — M72.2 PLANTAR FASCIAL FIBROMATOSIS OF LEFT FOOT: ICD-10-CM

## 2019-06-10 DIAGNOSIS — M77.50 ENTHESOPATHY OF ANKLE AND TARSUS: ICD-10-CM

## 2019-06-10 DIAGNOSIS — M72.2 PLANTAR FASCIITIS, BILATERAL: ICD-10-CM

## 2019-06-10 DIAGNOSIS — G89.29 CHRONIC PAIN OF TOES OF BOTH FEET: ICD-10-CM

## 2019-06-10 DIAGNOSIS — M79.675 CHRONIC PAIN OF TOES OF BOTH FEET: ICD-10-CM

## 2019-06-11 RX ORDER — TRAMADOL HYDROCHLORIDE 50 MG/1
TABLET ORAL
Qty: 84 TABLET | Refills: 0 | Status: SHIPPED | OUTPATIENT
Start: 2019-06-11 | End: 2019-07-01 | Stop reason: SDUPTHER

## 2019-07-01 DIAGNOSIS — G89.29 CHRONIC PAIN OF TOES OF BOTH FEET: ICD-10-CM

## 2019-07-01 DIAGNOSIS — M25.571 SINUS TARSI SYNDROME OF RIGHT ANKLE: ICD-10-CM

## 2019-07-01 DIAGNOSIS — M79.674 CHRONIC PAIN OF TOES OF BOTH FEET: ICD-10-CM

## 2019-07-01 DIAGNOSIS — M25.572 SINUS TARSI SYNDROME OF LEFT ANKLE: ICD-10-CM

## 2019-07-01 DIAGNOSIS — M77.50 ENTHESOPATHY OF ANKLE AND TARSUS: ICD-10-CM

## 2019-07-01 DIAGNOSIS — M72.2 PLANTAR FASCIAL FIBROMATOSIS OF LEFT FOOT: ICD-10-CM

## 2019-07-01 DIAGNOSIS — M72.2 PLANTAR FASCIITIS, BILATERAL: ICD-10-CM

## 2019-07-01 DIAGNOSIS — M79.675 CHRONIC PAIN OF TOES OF BOTH FEET: ICD-10-CM

## 2019-07-02 RX ORDER — TRAMADOL HYDROCHLORIDE 50 MG/1
TABLET ORAL
Qty: 84 TABLET | Refills: 0 | Status: SHIPPED | OUTPATIENT
Start: 2019-07-02 | End: 2019-07-22 | Stop reason: SDUPTHER

## 2019-07-22 DIAGNOSIS — M72.2 PLANTAR FASCIAL FIBROMATOSIS OF LEFT FOOT: ICD-10-CM

## 2019-07-22 DIAGNOSIS — G89.29 CHRONIC PAIN OF TOES OF BOTH FEET: ICD-10-CM

## 2019-07-22 DIAGNOSIS — M72.2 PLANTAR FASCIITIS, BILATERAL: ICD-10-CM

## 2019-07-22 DIAGNOSIS — M25.571 SINUS TARSI SYNDROME OF RIGHT ANKLE: ICD-10-CM

## 2019-07-22 DIAGNOSIS — M79.674 CHRONIC PAIN OF TOES OF BOTH FEET: ICD-10-CM

## 2019-07-22 DIAGNOSIS — M25.572 SINUS TARSI SYNDROME OF LEFT ANKLE: ICD-10-CM

## 2019-07-22 DIAGNOSIS — M79.675 CHRONIC PAIN OF TOES OF BOTH FEET: ICD-10-CM

## 2019-07-22 DIAGNOSIS — M77.50 ENTHESOPATHY OF ANKLE AND TARSUS: ICD-10-CM

## 2019-07-24 RX ORDER — TRAMADOL HYDROCHLORIDE 50 MG/1
TABLET ORAL
Qty: 84 TABLET | Refills: 0 | Status: SHIPPED | OUTPATIENT
Start: 2019-07-24 | End: 2019-08-13 | Stop reason: SDUPTHER

## 2019-08-13 DIAGNOSIS — M72.2 PLANTAR FASCIAL FIBROMATOSIS OF LEFT FOOT: ICD-10-CM

## 2019-08-13 DIAGNOSIS — M79.675 CHRONIC PAIN OF TOES OF BOTH FEET: ICD-10-CM

## 2019-08-13 DIAGNOSIS — M25.572 SINUS TARSI SYNDROME OF LEFT ANKLE: ICD-10-CM

## 2019-08-13 DIAGNOSIS — M72.2 PLANTAR FASCIITIS, BILATERAL: ICD-10-CM

## 2019-08-13 DIAGNOSIS — G89.29 CHRONIC PAIN OF TOES OF BOTH FEET: ICD-10-CM

## 2019-08-13 DIAGNOSIS — M25.571 SINUS TARSI SYNDROME OF RIGHT ANKLE: ICD-10-CM

## 2019-08-13 DIAGNOSIS — M79.674 CHRONIC PAIN OF TOES OF BOTH FEET: ICD-10-CM

## 2019-08-13 DIAGNOSIS — M77.50 ENTHESOPATHY OF ANKLE AND TARSUS: ICD-10-CM

## 2019-08-16 RX ORDER — TRAMADOL HYDROCHLORIDE 50 MG/1
TABLET ORAL
Qty: 84 TABLET | Refills: 0 | Status: SHIPPED | OUTPATIENT
Start: 2019-08-16 | End: 2019-09-06 | Stop reason: SDUPTHER

## 2019-09-06 DIAGNOSIS — G89.29 CHRONIC PAIN OF TOES OF BOTH FEET: ICD-10-CM

## 2019-09-06 DIAGNOSIS — M79.674 CHRONIC PAIN OF TOES OF BOTH FEET: ICD-10-CM

## 2019-09-06 DIAGNOSIS — M72.2 PLANTAR FASCIAL FIBROMATOSIS OF LEFT FOOT: ICD-10-CM

## 2019-09-06 DIAGNOSIS — M79.675 CHRONIC PAIN OF TOES OF BOTH FEET: ICD-10-CM

## 2019-09-06 DIAGNOSIS — M77.50 ENTHESOPATHY OF ANKLE AND TARSUS: ICD-10-CM

## 2019-09-06 DIAGNOSIS — M25.571 SINUS TARSI SYNDROME OF RIGHT ANKLE: ICD-10-CM

## 2019-09-06 DIAGNOSIS — M25.572 SINUS TARSI SYNDROME OF LEFT ANKLE: ICD-10-CM

## 2019-09-06 DIAGNOSIS — M72.2 PLANTAR FASCIITIS, BILATERAL: ICD-10-CM

## 2019-09-11 RX ORDER — TRAMADOL HYDROCHLORIDE 50 MG/1
TABLET ORAL
Qty: 84 TABLET | Refills: 0 | Status: SHIPPED | OUTPATIENT
Start: 2019-09-11 | End: 2019-10-02 | Stop reason: SDUPTHER

## 2019-10-02 DIAGNOSIS — G89.29 CHRONIC PAIN OF TOES OF BOTH FEET: ICD-10-CM

## 2019-10-02 DIAGNOSIS — M25.571 SINUS TARSI SYNDROME OF RIGHT ANKLE: ICD-10-CM

## 2019-10-02 DIAGNOSIS — M77.50 ENTHESOPATHY OF ANKLE AND TARSUS: ICD-10-CM

## 2019-10-02 DIAGNOSIS — M72.2 PLANTAR FASCIITIS, BILATERAL: ICD-10-CM

## 2019-10-02 DIAGNOSIS — M79.674 CHRONIC PAIN OF TOES OF BOTH FEET: ICD-10-CM

## 2019-10-02 DIAGNOSIS — M72.2 PLANTAR FASCIAL FIBROMATOSIS OF LEFT FOOT: ICD-10-CM

## 2019-10-02 DIAGNOSIS — M25.572 SINUS TARSI SYNDROME OF LEFT ANKLE: ICD-10-CM

## 2019-10-02 DIAGNOSIS — M79.675 CHRONIC PAIN OF TOES OF BOTH FEET: ICD-10-CM

## 2019-10-03 RX ORDER — TRAMADOL HYDROCHLORIDE 50 MG/1
TABLET ORAL
Qty: 84 TABLET | Refills: 0 | Status: SHIPPED | OUTPATIENT
Start: 2019-10-03 | End: 2019-10-22 | Stop reason: SDUPTHER

## 2019-11-13 ENCOUNTER — OFFICE VISIT (OUTPATIENT)
Dept: PODIATRY | Age: 44
End: 2019-11-13
Payer: COMMERCIAL

## 2019-11-13 VITALS — WEIGHT: 245 LBS | HEIGHT: 72 IN | BODY MASS INDEX: 33.18 KG/M2

## 2019-11-13 DIAGNOSIS — M79.675 CHRONIC PAIN OF TOES OF BOTH FEET: ICD-10-CM

## 2019-11-13 DIAGNOSIS — M25.571 SINUS TARSI SYNDROME OF RIGHT ANKLE: ICD-10-CM

## 2019-11-13 DIAGNOSIS — M25.572 SINUS TARSI SYNDROME OF LEFT ANKLE: ICD-10-CM

## 2019-11-13 DIAGNOSIS — G89.29 CHRONIC PAIN OF TOES OF BOTH FEET: ICD-10-CM

## 2019-11-13 DIAGNOSIS — M79.674 CHRONIC PAIN OF TOES OF BOTH FEET: ICD-10-CM

## 2019-11-13 DIAGNOSIS — M72.2 PLANTAR FASCIAL FIBROMATOSIS OF LEFT FOOT: ICD-10-CM

## 2019-11-13 DIAGNOSIS — M72.2 PLANTAR FASCIITIS, BILATERAL: ICD-10-CM

## 2019-11-13 DIAGNOSIS — M77.50 ENTHESOPATHY OF ANKLE AND TARSUS: ICD-10-CM

## 2019-11-13 PROCEDURE — 20605 DRAIN/INJ JOINT/BURSA W/O US: CPT | Performed by: PODIATRIST

## 2019-11-13 PROCEDURE — 99213 OFFICE O/P EST LOW 20 MIN: CPT | Performed by: PODIATRIST

## 2019-11-13 RX ORDER — TRAMADOL HYDROCHLORIDE 50 MG/1
50 TABLET ORAL EVERY 6 HOURS PRN
Qty: 84 TABLET | Refills: 0 | Status: SHIPPED | OUTPATIENT
Start: 2019-11-13 | End: 2019-12-04 | Stop reason: SDUPTHER

## 2019-11-13 RX ORDER — TESTOSTERONE CYPIONATE 200 MG/ML
6 INJECTION INTRAMUSCULAR ONCE
Status: COMPLETED | OUTPATIENT
Start: 2019-11-13 | End: 2019-11-13

## 2019-11-13 RX ORDER — BUPIVACAINE HYDROCHLORIDE 5 MG/ML
1 INJECTION, SOLUTION PERINEURAL ONCE
Status: COMPLETED | OUTPATIENT
Start: 2019-11-13 | End: 2019-11-13

## 2019-11-13 RX ADMIN — BUPIVACAINE HYDROCHLORIDE 5 MG: 5 INJECTION, SOLUTION PERINEURAL at 17:00

## 2019-11-13 RX ADMIN — TESTOSTERONE CYPIONATE 6 MG: 200 INJECTION INTRAMUSCULAR at 17:00

## 2019-11-13 ASSESSMENT — ENCOUNTER SYMPTOMS
NAUSEA: 0
DIARRHEA: 0
CONSTIPATION: 0
VOMITING: 0
BACK PAIN: 1
COLOR CHANGE: 0

## 2019-12-04 DIAGNOSIS — M25.571 SINUS TARSI SYNDROME OF RIGHT ANKLE: ICD-10-CM

## 2019-12-04 DIAGNOSIS — M72.2 PLANTAR FASCIITIS, BILATERAL: ICD-10-CM

## 2019-12-04 DIAGNOSIS — M79.674 CHRONIC PAIN OF TOES OF BOTH FEET: ICD-10-CM

## 2019-12-04 DIAGNOSIS — M25.572 SINUS TARSI SYNDROME OF LEFT ANKLE: ICD-10-CM

## 2019-12-04 DIAGNOSIS — M72.2 PLANTAR FASCIAL FIBROMATOSIS OF LEFT FOOT: ICD-10-CM

## 2019-12-04 DIAGNOSIS — G89.29 CHRONIC PAIN OF TOES OF BOTH FEET: ICD-10-CM

## 2019-12-04 DIAGNOSIS — M77.50 ENTHESOPATHY OF ANKLE AND TARSUS: ICD-10-CM

## 2019-12-04 DIAGNOSIS — M79.675 CHRONIC PAIN OF TOES OF BOTH FEET: ICD-10-CM

## 2019-12-04 RX ORDER — TRAMADOL HYDROCHLORIDE 50 MG/1
TABLET ORAL
Qty: 84 TABLET | Refills: 0 | Status: SHIPPED | OUTPATIENT
Start: 2019-12-04 | End: 2019-12-23

## 2019-12-23 DIAGNOSIS — M72.2 PLANTAR FASCIAL FIBROMATOSIS OF LEFT FOOT: ICD-10-CM

## 2019-12-23 DIAGNOSIS — G89.29 CHRONIC PAIN OF TOES OF BOTH FEET: ICD-10-CM

## 2019-12-23 DIAGNOSIS — M72.2 PLANTAR FASCIITIS, BILATERAL: ICD-10-CM

## 2019-12-23 DIAGNOSIS — M25.571 SINUS TARSI SYNDROME OF RIGHT ANKLE: ICD-10-CM

## 2019-12-23 DIAGNOSIS — M25.572 SINUS TARSI SYNDROME OF LEFT ANKLE: ICD-10-CM

## 2019-12-23 DIAGNOSIS — M79.674 CHRONIC PAIN OF TOES OF BOTH FEET: ICD-10-CM

## 2019-12-23 DIAGNOSIS — M77.50 ENTHESOPATHY OF ANKLE AND TARSUS: ICD-10-CM

## 2019-12-23 DIAGNOSIS — M79.675 CHRONIC PAIN OF TOES OF BOTH FEET: ICD-10-CM

## 2019-12-23 RX ORDER — TRAMADOL HYDROCHLORIDE 50 MG/1
TABLET ORAL
Qty: 84 TABLET | Refills: 0 | Status: SHIPPED | OUTPATIENT
Start: 2019-12-23 | End: 2020-01-14

## 2020-01-14 RX ORDER — NALOXONE HYDROCHLORIDE 4 MG/.1ML
1 SPRAY NASAL PRN
Qty: 1 EACH | Refills: 5 | Status: SHIPPED | OUTPATIENT
Start: 2020-01-14

## 2020-01-14 RX ORDER — TRAMADOL HYDROCHLORIDE 50 MG/1
TABLET ORAL
Qty: 84 TABLET | Refills: 0 | Status: SHIPPED | OUTPATIENT
Start: 2020-01-14 | End: 2020-02-04

## 2020-02-05 RX ORDER — TRAMADOL HYDROCHLORIDE 50 MG/1
TABLET ORAL
Qty: 84 TABLET | Refills: 0 | Status: SHIPPED | OUTPATIENT
Start: 2020-02-05 | End: 2020-02-27

## 2020-02-27 RX ORDER — TRAMADOL HYDROCHLORIDE 50 MG/1
TABLET ORAL
Qty: 84 TABLET | Refills: 0 | Status: SHIPPED | OUTPATIENT
Start: 2020-02-27 | End: 2020-03-17 | Stop reason: SDUPTHER

## 2020-03-17 RX ORDER — TRAMADOL HYDROCHLORIDE 50 MG/1
50 TABLET ORAL EVERY 6 HOURS PRN
Qty: 84 TABLET | Refills: 0 | Status: SHIPPED | OUTPATIENT
Start: 2020-03-17 | End: 2020-04-08

## 2020-04-08 RX ORDER — TRAMADOL HYDROCHLORIDE 50 MG/1
TABLET ORAL
Qty: 84 TABLET | Refills: 0 | Status: SHIPPED | OUTPATIENT
Start: 2020-04-08 | End: 2020-04-30 | Stop reason: SDUPTHER

## 2020-04-30 ENCOUNTER — TELEPHONE (OUTPATIENT)
Dept: PODIATRY | Age: 45
End: 2020-04-30

## 2020-04-30 RX ORDER — TRAMADOL HYDROCHLORIDE 50 MG/1
50 TABLET ORAL EVERY 8 HOURS PRN
Qty: 84 TABLET | Refills: 0 | Status: SHIPPED | OUTPATIENT
Start: 2020-04-30 | End: 2020-05-14 | Stop reason: SDUPTHER

## 2020-05-15 RX ORDER — TRAMADOL HYDROCHLORIDE 50 MG/1
50 TABLET ORAL EVERY 6 HOURS PRN
Qty: 84 TABLET | Refills: 0 | Status: SHIPPED | OUTPATIENT
Start: 2020-05-15 | End: 2020-06-08

## 2020-06-08 RX ORDER — TRAMADOL HYDROCHLORIDE 50 MG/1
TABLET ORAL
Qty: 84 TABLET | Refills: 0 | Status: SHIPPED | OUTPATIENT
Start: 2020-06-08 | End: 2020-06-25 | Stop reason: SDUPTHER

## 2020-06-08 NOTE — TELEPHONE ENCOUNTER
Please Approve or Refuse.      Next Visit Date:  6/10/2020   Last Visit Date: 11/13/2019    No results found for: LABA1C          ( goal A1C is < 7)   BP Readings from Last 3 Encounters:   02/01/18 (!) 180/126   01/09/17 (!) 142/93   10/03/16 (!) 139/96          (goal 120/80)  BUN   Date Value Ref Range Status   01/30/2015 14 6 - 20 mg/dL Final     CREATININE   Date Value Ref Range Status   01/30/2015 0.71 0.70 - 1.20 mg/dL Final     Potassium   Date Value Ref Range Status   01/30/2015 3.7 3.7 - 5.3 mmol/L Final

## 2020-06-25 ENCOUNTER — OFFICE VISIT (OUTPATIENT)
Dept: PODIATRY | Age: 45
End: 2020-06-25
Payer: COMMERCIAL

## 2020-06-25 VITALS — BODY MASS INDEX: 33.18 KG/M2 | WEIGHT: 245 LBS | HEIGHT: 72 IN

## 2020-06-25 PROCEDURE — 99213 OFFICE O/P EST LOW 20 MIN: CPT | Performed by: PODIATRIST

## 2020-06-25 PROCEDURE — 20605 DRAIN/INJ JOINT/BURSA W/O US: CPT | Performed by: PODIATRIST

## 2020-06-25 RX ORDER — TRAMADOL HYDROCHLORIDE 50 MG/1
50 TABLET ORAL EVERY 6 HOURS PRN
Qty: 84 TABLET | Refills: 0 | Status: SHIPPED | OUTPATIENT
Start: 2020-06-25 | End: 2020-07-17

## 2020-06-25 RX ORDER — TESTOSTERONE CYPIONATE 200 MG/ML
6 INJECTION INTRAMUSCULAR ONCE
Status: COMPLETED | OUTPATIENT
Start: 2020-06-25 | End: 2020-06-25

## 2020-06-25 RX ORDER — BUPIVACAINE HYDROCHLORIDE 5 MG/ML
1 INJECTION, SOLUTION PERINEURAL ONCE
Status: COMPLETED | OUTPATIENT
Start: 2020-06-25 | End: 2020-06-25

## 2020-06-25 RX ADMIN — BUPIVACAINE HYDROCHLORIDE 5 MG: 5 INJECTION, SOLUTION PERINEURAL at 17:30

## 2020-06-25 RX ADMIN — TESTOSTERONE CYPIONATE 6 MG: 200 INJECTION INTRAMUSCULAR at 17:30

## 2020-06-25 ASSESSMENT — ENCOUNTER SYMPTOMS
DIARRHEA: 0
NAUSEA: 0
CONSTIPATION: 0
VOMITING: 0
BACK PAIN: 1
COLOR CHANGE: 0

## 2020-06-25 NOTE — PROGRESS NOTES
Bilateral.      Vascular: DP and PT pulses palpable 2/4, Bilateral.  CFT <3 seconds, Bilateral.  Hair growth present to the level of the digits, Bilateral.  Edema absent, Bilateral.  Varicosities absent, Bilateral. Erythema absent, Bilateral    Neurological:  Sensation present to light touch to level of digits, Bilateral.      Musculoskeletal: Muscle strength 5/5, Bilateral.  Pain present upon palpation of central left arch with a palpable nodule within the plantar fascia, Left, small nodule noted on the right, minimal pain. Chales Minus decreased medial longitudinal arch, Bilateral.  Ankle ROM normal,Bilateral.  Pain bilateral sinus tarsi, right most severe      Asessment: Patient is a 39 y.o. male with:   1. Plantar fasciitis, bilateral    2. Enthesopathy of ankle and tarsus    3. Plantar fascial fibromatosis of left foot    4. Sinus tarsi syndrome of right ankle    5. Sinus tarsi syndrome of left ankle    6. Chronic pain of toes of both feet        Plan: Patient examined and evaluated. Current condition and treatment options discussed in detail. Advised pt to continue with orthotics and new boots. Verbal and written instructions given to patient. Contact office with any questions/problems/concerns. 1) Ice, massage, orthotics. 2) continue Ultram  3) Injection Type: Intermediate Joint/Bursa    Anatomic Location: right sinus tarsi    Verbal consent obtained from patient for injection after all questions answered. Right foot palpated and most tender location identified. This area was prepped with an alcohol swab and 3 cc total of 1cc 0.5% marcaine plain, 1 cc dexamethasone phosphate, and 1 cc depo medrol was injected to the right. A band-aid was applied, patient advised of possible local steroid reaction symptoms, and patient instructed to limit activities to this area for 24 hours. Medications ordered during this encounter were injected into the right.      Orders Placed This Encounter   Procedures    MD ARTHROCENTESIS ASPIR&/INJ INTERM JT/BURS W/O US     Orders Placed This Encounter   Medications    bupivacaine (MARCAINE) 0.5 % injection 5 mg    Dexamethasone Sodium Phosphate injection 6 mg    methylPREDNISolone acetate SUSP 10 mg    traMADol (ULTRAM) 50 MG tablet     Sig: Take 1 tablet by mouth every 6 hours as needed for Pain for up to 30 days. Dispense:  84 tablet     Refill:  0     Reduce doses taken as pain becomes manageable     Controlled Substances Monitoring:          Chronic Pain Management  1. Plantar fasciitis, bilateral    2. Enthesopathy of ankle and tarsus    3. Plantar fascial fibromatosis of left foot    4. Sinus tarsi syndrome of right ankle    5. Sinus tarsi syndrome of left ankle    6. Chronic pain of toes of both feet      1) non-medication and non-opioid treatments discussed with the patient. Previous treatments include: orthotics, new boots, NSAIDS, physical therapy    2) Laboratory or diagnostic testing   Was a urine drug test performed: No    3) OARRS check   Controlled Substances Monitoring:     RX Monitoring 2/27/2020   Attestation -   Periodic Controlled Substance Monitoring Possible medication side effects, risk of tolerance/dependence & alternative treatments discussed. ;No signs of potential drug abuse or diversion identified. ;Assessed functional status. ;Obtaining appropriate analgesic effect of treatment. Chronic Pain > 50 MEDD -   Chronic Pain > 80 MEDD -       4)  Objective goals for treatment: Goals of Therapy: Decrease in pain level The current treatment includes non-medication and not-opioid treatments documented above. Opioid treatment is needed for this patient to achieve necessary activities of daily living including, but not limited to,  ambulating around their residence, preparing food, light house keeping, and self care including bathing and grooming. The planned duration of treatment is 17 months. 5) This patient will be assessed monthly.   Progress toward goal: Continue present management    The following were discussed with the patient. Benefits and risks of the medication, including potential for addiction and risk of overdose were discussed with the patient. We discussed that it is the patients responsibility to safely store and appropriately dispose of the medication. Permission for drug screening and release to speak with other practitioners concerning the patients condition or treatment; Cooperation with pill counts or other checks designed to assure compliance with the treatment plan and to minimize the risk of misuse or diversion; The understanding that the patient shall only receive opioid medications from the physician treating the chronic pain unless there is written agreement among all of the prescribers of opioids outlining the responsibilities and boundaries of prescribing for the patient; and The understanding that the dosage may be tapered if not effective or if the patient does not abide by the treatment agreement. Dosage will not exceed one hundred twenty MED per day.   Is prescription for >120 pills per month: No      RTC in 2-3 months  6/25/2020

## 2020-07-16 NOTE — TELEPHONE ENCOUNTER
Please Approve or Refuse.   Send to Pharmacy per Pt's Request:      Next Visit Date:  Visit date not found   Last Visit Date: 6/25/2020    No results found for: LABA1C          ( goal A1C is < 7)   BP Readings from Last 3 Encounters:   02/01/18 (!) 180/126   01/09/17 (!) 142/93   10/03/16 (!) 139/96          (goal 120/80)  BUN   Date Value Ref Range Status   01/30/2015 14 6 - 20 mg/dL Final     CREATININE   Date Value Ref Range Status   01/30/2015 0.71 0.70 - 1.20 mg/dL Final     Potassium   Date Value Ref Range Status   01/30/2015 3.7 3.7 - 5.3 mmol/L Final

## 2020-07-17 RX ORDER — TRAMADOL HYDROCHLORIDE 50 MG/1
TABLET ORAL
Qty: 84 TABLET | Refills: 0 | Status: SHIPPED | OUTPATIENT
Start: 2020-07-17 | End: 2020-08-06

## 2020-08-06 RX ORDER — TRAMADOL HYDROCHLORIDE 50 MG/1
TABLET ORAL
Qty: 84 TABLET | Refills: 0 | Status: SHIPPED | OUTPATIENT
Start: 2020-08-06 | End: 2020-08-26

## 2020-08-06 NOTE — TELEPHONE ENCOUNTER
Please Approve or Refuse.        Next Visit Date:  Visit date not found   Last Visit Date: 6/25/2020    No results found for: LABA1C          ( goal A1C is < 7)   BP Readings from Last 3 Encounters:   02/01/18 (!) 180/126   01/09/17 (!) 142/93   10/03/16 (!) 139/96          (goal 120/80)  BUN   Date Value Ref Range Status   01/30/2015 14 6 - 20 mg/dL Final     CREATININE   Date Value Ref Range Status   01/30/2015 0.71 0.70 - 1.20 mg/dL Final     Potassium   Date Value Ref Range Status   01/30/2015 3.7 3.7 - 5.3 mmol/L Final

## 2020-08-26 RX ORDER — TRAMADOL HYDROCHLORIDE 50 MG/1
TABLET ORAL
Qty: 84 TABLET | Refills: 0 | Status: SHIPPED | OUTPATIENT
Start: 2020-08-26 | End: 2020-09-17

## 2020-09-17 RX ORDER — TRAMADOL HYDROCHLORIDE 50 MG/1
TABLET ORAL
Qty: 84 TABLET | Refills: 0 | Status: SHIPPED | OUTPATIENT
Start: 2020-09-17 | End: 2020-10-06

## 2020-10-06 RX ORDER — TRAMADOL HYDROCHLORIDE 50 MG/1
TABLET ORAL
Qty: 84 TABLET | Refills: 0 | Status: SHIPPED | OUTPATIENT
Start: 2020-10-06 | End: 2020-10-27

## 2020-10-27 RX ORDER — TRAMADOL HYDROCHLORIDE 50 MG/1
TABLET ORAL
Qty: 84 TABLET | Refills: 0 | Status: SHIPPED | OUTPATIENT
Start: 2020-10-27 | End: 2020-11-17

## 2020-11-17 RX ORDER — TRAMADOL HYDROCHLORIDE 50 MG/1
TABLET ORAL
Qty: 84 TABLET | Refills: 0 | Status: SHIPPED | OUTPATIENT
Start: 2020-11-17 | End: 2020-12-08

## 2020-12-08 RX ORDER — TRAMADOL HYDROCHLORIDE 50 MG/1
TABLET ORAL
Qty: 84 TABLET | Refills: 0 | Status: SHIPPED | OUTPATIENT
Start: 2020-12-08 | End: 2021-01-06

## 2020-12-28 DIAGNOSIS — M72.2 PLANTAR FASCIITIS, BILATERAL: ICD-10-CM

## 2020-12-28 DIAGNOSIS — M25.571 SINUS TARSI SYNDROME OF RIGHT ANKLE: ICD-10-CM

## 2020-12-28 DIAGNOSIS — G89.29 CHRONIC PAIN OF TOES OF BOTH FEET: ICD-10-CM

## 2020-12-28 DIAGNOSIS — M25.572 SINUS TARSI SYNDROME OF LEFT ANKLE: ICD-10-CM

## 2020-12-28 DIAGNOSIS — M77.50 ENTHESOPATHY OF ANKLE AND TARSUS: ICD-10-CM

## 2020-12-28 DIAGNOSIS — M79.674 CHRONIC PAIN OF TOES OF BOTH FEET: ICD-10-CM

## 2020-12-28 DIAGNOSIS — M79.675 CHRONIC PAIN OF TOES OF BOTH FEET: ICD-10-CM

## 2020-12-28 DIAGNOSIS — M72.2 PLANTAR FASCIAL FIBROMATOSIS OF LEFT FOOT: ICD-10-CM

## 2021-01-06 RX ORDER — TRAMADOL HYDROCHLORIDE 50 MG/1
TABLET ORAL
Qty: 84 TABLET | Refills: 0 | Status: SHIPPED | OUTPATIENT
Start: 2021-01-06 | End: 2021-01-26 | Stop reason: SDUPTHER

## 2021-01-26 ENCOUNTER — TELEPHONE (OUTPATIENT)
Dept: PODIATRY | Age: 46
End: 2021-01-26

## 2021-01-26 DIAGNOSIS — M79.675 CHRONIC PAIN OF TOES OF BOTH FEET: ICD-10-CM

## 2021-01-26 DIAGNOSIS — M25.572 SINUS TARSI SYNDROME OF LEFT ANKLE: ICD-10-CM

## 2021-01-26 DIAGNOSIS — M72.2 PLANTAR FASCIITIS, BILATERAL: Primary | ICD-10-CM

## 2021-01-26 DIAGNOSIS — M77.50 ENTHESOPATHY OF ANKLE AND TARSUS: ICD-10-CM

## 2021-01-26 DIAGNOSIS — M79.674 CHRONIC PAIN OF TOES OF BOTH FEET: ICD-10-CM

## 2021-01-26 DIAGNOSIS — M25.571 SINUS TARSI SYNDROME OF RIGHT ANKLE: ICD-10-CM

## 2021-01-26 DIAGNOSIS — G89.29 CHRONIC PAIN OF TOES OF BOTH FEET: ICD-10-CM

## 2021-01-26 DIAGNOSIS — M72.2 PLANTAR FASCIAL FIBROMATOSIS OF LEFT FOOT: ICD-10-CM

## 2021-01-26 RX ORDER — TRAMADOL HYDROCHLORIDE 50 MG/1
50 TABLET ORAL EVERY 6 HOURS PRN
Qty: 84 TABLET | Refills: 0 | Status: SHIPPED | OUTPATIENT
Start: 2021-01-26 | End: 2021-02-11

## 2021-02-11 DIAGNOSIS — M72.2 PLANTAR FASCIAL FIBROMATOSIS OF LEFT FOOT: ICD-10-CM

## 2021-02-11 DIAGNOSIS — M25.571 SINUS TARSI SYNDROME OF RIGHT ANKLE: ICD-10-CM

## 2021-02-11 DIAGNOSIS — M77.50 ENTHESOPATHY OF ANKLE AND TARSUS: ICD-10-CM

## 2021-02-11 DIAGNOSIS — M79.674 CHRONIC PAIN OF TOES OF BOTH FEET: ICD-10-CM

## 2021-02-11 DIAGNOSIS — G89.29 CHRONIC PAIN OF TOES OF BOTH FEET: ICD-10-CM

## 2021-02-11 DIAGNOSIS — M25.572 SINUS TARSI SYNDROME OF LEFT ANKLE: ICD-10-CM

## 2021-02-11 DIAGNOSIS — M79.675 CHRONIC PAIN OF TOES OF BOTH FEET: ICD-10-CM

## 2021-02-11 DIAGNOSIS — M72.2 PLANTAR FASCIITIS, BILATERAL: ICD-10-CM

## 2021-02-11 RX ORDER — TRAMADOL HYDROCHLORIDE 50 MG/1
TABLET ORAL
Qty: 84 TABLET | Refills: 0 | Status: SHIPPED | OUTPATIENT
Start: 2021-02-11 | End: 2021-03-08

## 2021-03-08 DIAGNOSIS — M25.572 SINUS TARSI SYNDROME OF LEFT ANKLE: ICD-10-CM

## 2021-03-08 DIAGNOSIS — M25.571 SINUS TARSI SYNDROME OF RIGHT ANKLE: ICD-10-CM

## 2021-03-08 DIAGNOSIS — M79.675 CHRONIC PAIN OF TOES OF BOTH FEET: ICD-10-CM

## 2021-03-08 DIAGNOSIS — M77.50 ENTHESOPATHY OF ANKLE AND TARSUS: ICD-10-CM

## 2021-03-08 DIAGNOSIS — M72.2 PLANTAR FASCIITIS, BILATERAL: ICD-10-CM

## 2021-03-08 DIAGNOSIS — G89.29 CHRONIC PAIN OF TOES OF BOTH FEET: ICD-10-CM

## 2021-03-08 DIAGNOSIS — M79.674 CHRONIC PAIN OF TOES OF BOTH FEET: ICD-10-CM

## 2021-03-08 DIAGNOSIS — M72.2 PLANTAR FASCIAL FIBROMATOSIS OF LEFT FOOT: ICD-10-CM

## 2021-03-08 RX ORDER — TRAMADOL HYDROCHLORIDE 50 MG/1
TABLET ORAL
Qty: 84 TABLET | Refills: 0 | Status: SHIPPED | OUTPATIENT
Start: 2021-03-08 | End: 2021-03-29

## 2021-03-28 DIAGNOSIS — M79.675 CHRONIC PAIN OF TOES OF BOTH FEET: ICD-10-CM

## 2021-03-28 DIAGNOSIS — M25.571 SINUS TARSI SYNDROME OF RIGHT ANKLE: ICD-10-CM

## 2021-03-28 DIAGNOSIS — M72.2 PLANTAR FASCIAL FIBROMATOSIS OF LEFT FOOT: ICD-10-CM

## 2021-03-28 DIAGNOSIS — M25.572 SINUS TARSI SYNDROME OF LEFT ANKLE: ICD-10-CM

## 2021-03-28 DIAGNOSIS — M72.2 PLANTAR FASCIITIS, BILATERAL: ICD-10-CM

## 2021-03-28 DIAGNOSIS — M77.50 ENTHESOPATHY OF ANKLE AND TARSUS: ICD-10-CM

## 2021-03-28 DIAGNOSIS — M79.674 CHRONIC PAIN OF TOES OF BOTH FEET: ICD-10-CM

## 2021-03-28 DIAGNOSIS — G89.29 CHRONIC PAIN OF TOES OF BOTH FEET: ICD-10-CM

## 2021-03-29 RX ORDER — TRAMADOL HYDROCHLORIDE 50 MG/1
TABLET ORAL
Qty: 84 TABLET | Refills: 0 | Status: SHIPPED | OUTPATIENT
Start: 2021-03-29 | End: 2021-04-19

## 2021-04-18 DIAGNOSIS — M72.2 PLANTAR FASCIAL FIBROMATOSIS OF LEFT FOOT: ICD-10-CM

## 2021-04-18 DIAGNOSIS — M79.674 CHRONIC PAIN OF TOES OF BOTH FEET: ICD-10-CM

## 2021-04-18 DIAGNOSIS — M25.572 SINUS TARSI SYNDROME OF LEFT ANKLE: ICD-10-CM

## 2021-04-18 DIAGNOSIS — M25.571 SINUS TARSI SYNDROME OF RIGHT ANKLE: ICD-10-CM

## 2021-04-18 DIAGNOSIS — G89.29 CHRONIC PAIN OF TOES OF BOTH FEET: ICD-10-CM

## 2021-04-18 DIAGNOSIS — M79.675 CHRONIC PAIN OF TOES OF BOTH FEET: ICD-10-CM

## 2021-04-18 DIAGNOSIS — M77.50 ENTHESOPATHY OF ANKLE AND TARSUS: ICD-10-CM

## 2021-04-18 DIAGNOSIS — M72.2 PLANTAR FASCIITIS, BILATERAL: ICD-10-CM

## 2021-04-19 RX ORDER — TRAMADOL HYDROCHLORIDE 50 MG/1
TABLET ORAL
Qty: 84 TABLET | Refills: 0 | Status: SHIPPED | OUTPATIENT
Start: 2021-04-19 | End: 2021-05-11

## 2021-05-10 DIAGNOSIS — G89.29 CHRONIC PAIN OF TOES OF BOTH FEET: ICD-10-CM

## 2021-05-10 DIAGNOSIS — M77.50 ENTHESOPATHY OF ANKLE AND TARSUS: ICD-10-CM

## 2021-05-10 DIAGNOSIS — M79.674 CHRONIC PAIN OF TOES OF BOTH FEET: ICD-10-CM

## 2021-05-10 DIAGNOSIS — M25.572 SINUS TARSI SYNDROME OF LEFT ANKLE: ICD-10-CM

## 2021-05-10 DIAGNOSIS — M72.2 PLANTAR FASCIITIS, BILATERAL: ICD-10-CM

## 2021-05-10 DIAGNOSIS — M79.675 CHRONIC PAIN OF TOES OF BOTH FEET: ICD-10-CM

## 2021-05-10 DIAGNOSIS — M72.2 PLANTAR FASCIAL FIBROMATOSIS OF LEFT FOOT: ICD-10-CM

## 2021-05-10 DIAGNOSIS — M25.571 SINUS TARSI SYNDROME OF RIGHT ANKLE: ICD-10-CM

## 2021-05-11 RX ORDER — TRAMADOL HYDROCHLORIDE 50 MG/1
TABLET ORAL
Qty: 84 TABLET | Refills: 0 | Status: SHIPPED | OUTPATIENT
Start: 2021-05-11 | End: 2021-06-02

## 2021-06-01 DIAGNOSIS — M25.571 SINUS TARSI SYNDROME OF RIGHT ANKLE: ICD-10-CM

## 2021-06-01 DIAGNOSIS — M72.2 PLANTAR FASCIITIS, BILATERAL: ICD-10-CM

## 2021-06-01 DIAGNOSIS — M79.675 CHRONIC PAIN OF TOES OF BOTH FEET: ICD-10-CM

## 2021-06-01 DIAGNOSIS — M77.50 ENTHESOPATHY OF ANKLE AND TARSUS: ICD-10-CM

## 2021-06-01 DIAGNOSIS — M72.2 PLANTAR FASCIAL FIBROMATOSIS OF LEFT FOOT: ICD-10-CM

## 2021-06-01 DIAGNOSIS — M25.572 SINUS TARSI SYNDROME OF LEFT ANKLE: ICD-10-CM

## 2021-06-01 DIAGNOSIS — M79.674 CHRONIC PAIN OF TOES OF BOTH FEET: ICD-10-CM

## 2021-06-01 DIAGNOSIS — G89.29 CHRONIC PAIN OF TOES OF BOTH FEET: ICD-10-CM

## 2021-06-02 RX ORDER — TRAMADOL HYDROCHLORIDE 50 MG/1
TABLET ORAL
Qty: 84 TABLET | Refills: 0 | Status: SHIPPED | OUTPATIENT
Start: 2021-06-02 | End: 2021-06-23

## 2021-06-21 DIAGNOSIS — M25.572 SINUS TARSI SYNDROME OF LEFT ANKLE: ICD-10-CM

## 2021-06-21 DIAGNOSIS — G89.29 CHRONIC PAIN OF TOES OF BOTH FEET: ICD-10-CM

## 2021-06-21 DIAGNOSIS — M72.2 PLANTAR FASCIAL FIBROMATOSIS OF LEFT FOOT: ICD-10-CM

## 2021-06-21 DIAGNOSIS — M72.2 PLANTAR FASCIITIS, BILATERAL: ICD-10-CM

## 2021-06-21 DIAGNOSIS — M25.571 SINUS TARSI SYNDROME OF RIGHT ANKLE: ICD-10-CM

## 2021-06-21 DIAGNOSIS — M79.674 CHRONIC PAIN OF TOES OF BOTH FEET: ICD-10-CM

## 2021-06-21 DIAGNOSIS — M77.50 ENTHESOPATHY OF ANKLE AND TARSUS: ICD-10-CM

## 2021-06-21 DIAGNOSIS — M79.675 CHRONIC PAIN OF TOES OF BOTH FEET: ICD-10-CM

## 2021-06-23 RX ORDER — TRAMADOL HYDROCHLORIDE 50 MG/1
TABLET ORAL
Qty: 84 TABLET | Refills: 0 | Status: SHIPPED | OUTPATIENT
Start: 2021-06-23 | End: 2021-07-14

## 2021-07-12 DIAGNOSIS — M25.571 SINUS TARSI SYNDROME OF RIGHT ANKLE: ICD-10-CM

## 2021-07-12 DIAGNOSIS — M79.674 CHRONIC PAIN OF TOES OF BOTH FEET: ICD-10-CM

## 2021-07-12 DIAGNOSIS — G89.29 CHRONIC PAIN OF TOES OF BOTH FEET: ICD-10-CM

## 2021-07-12 DIAGNOSIS — M77.50 ENTHESOPATHY OF ANKLE AND TARSUS: ICD-10-CM

## 2021-07-12 DIAGNOSIS — M25.572 SINUS TARSI SYNDROME OF LEFT ANKLE: ICD-10-CM

## 2021-07-12 DIAGNOSIS — M72.2 PLANTAR FASCIAL FIBROMATOSIS OF LEFT FOOT: ICD-10-CM

## 2021-07-12 DIAGNOSIS — M79.675 CHRONIC PAIN OF TOES OF BOTH FEET: ICD-10-CM

## 2021-07-12 DIAGNOSIS — M72.2 PLANTAR FASCIITIS, BILATERAL: ICD-10-CM

## 2021-07-14 RX ORDER — TRAMADOL HYDROCHLORIDE 50 MG/1
TABLET ORAL
Qty: 84 TABLET | Refills: 0 | Status: SHIPPED | OUTPATIENT
Start: 2021-07-14 | End: 2021-08-03

## 2021-08-02 DIAGNOSIS — M77.50 ENTHESOPATHY OF ANKLE AND TARSUS: ICD-10-CM

## 2021-08-02 DIAGNOSIS — M25.571 SINUS TARSI SYNDROME OF RIGHT ANKLE: ICD-10-CM

## 2021-08-02 DIAGNOSIS — M72.2 PLANTAR FASCIITIS, BILATERAL: ICD-10-CM

## 2021-08-02 DIAGNOSIS — M79.674 CHRONIC PAIN OF TOES OF BOTH FEET: ICD-10-CM

## 2021-08-02 DIAGNOSIS — M79.675 CHRONIC PAIN OF TOES OF BOTH FEET: ICD-10-CM

## 2021-08-02 DIAGNOSIS — G89.29 CHRONIC PAIN OF TOES OF BOTH FEET: ICD-10-CM

## 2021-08-02 DIAGNOSIS — M25.572 SINUS TARSI SYNDROME OF LEFT ANKLE: ICD-10-CM

## 2021-08-02 DIAGNOSIS — M72.2 PLANTAR FASCIAL FIBROMATOSIS OF LEFT FOOT: ICD-10-CM

## 2021-08-03 RX ORDER — TRAMADOL HYDROCHLORIDE 50 MG/1
TABLET ORAL
Qty: 84 TABLET | Refills: 0 | Status: SHIPPED | OUTPATIENT
Start: 2021-08-03 | End: 2021-08-24

## 2021-08-23 DIAGNOSIS — M77.50 ENTHESOPATHY OF ANKLE AND TARSUS: ICD-10-CM

## 2021-08-23 DIAGNOSIS — G89.29 CHRONIC PAIN OF TOES OF BOTH FEET: ICD-10-CM

## 2021-08-23 DIAGNOSIS — M72.2 PLANTAR FASCIITIS, BILATERAL: ICD-10-CM

## 2021-08-23 DIAGNOSIS — M25.571 SINUS TARSI SYNDROME OF RIGHT ANKLE: ICD-10-CM

## 2021-08-23 DIAGNOSIS — M79.675 CHRONIC PAIN OF TOES OF BOTH FEET: ICD-10-CM

## 2021-08-23 DIAGNOSIS — M79.674 CHRONIC PAIN OF TOES OF BOTH FEET: ICD-10-CM

## 2021-08-23 DIAGNOSIS — M25.572 SINUS TARSI SYNDROME OF LEFT ANKLE: ICD-10-CM

## 2021-08-23 DIAGNOSIS — M72.2 PLANTAR FASCIAL FIBROMATOSIS OF LEFT FOOT: ICD-10-CM

## 2021-08-24 RX ORDER — TRAMADOL HYDROCHLORIDE 50 MG/1
TABLET ORAL
Qty: 84 TABLET | Refills: 0 | Status: SHIPPED | OUTPATIENT
Start: 2021-08-24 | End: 2021-09-15

## 2021-09-14 DIAGNOSIS — M72.2 PLANTAR FASCIAL FIBROMATOSIS OF LEFT FOOT: ICD-10-CM

## 2021-09-14 DIAGNOSIS — G89.29 CHRONIC PAIN OF TOES OF BOTH FEET: ICD-10-CM

## 2021-09-14 DIAGNOSIS — M77.50 ENTHESOPATHY OF ANKLE AND TARSUS: ICD-10-CM

## 2021-09-14 DIAGNOSIS — M25.572 SINUS TARSI SYNDROME OF LEFT ANKLE: ICD-10-CM

## 2021-09-14 DIAGNOSIS — M79.675 CHRONIC PAIN OF TOES OF BOTH FEET: ICD-10-CM

## 2021-09-14 DIAGNOSIS — M25.571 SINUS TARSI SYNDROME OF RIGHT ANKLE: ICD-10-CM

## 2021-09-14 DIAGNOSIS — M79.674 CHRONIC PAIN OF TOES OF BOTH FEET: ICD-10-CM

## 2021-09-14 DIAGNOSIS — M72.2 PLANTAR FASCIITIS, BILATERAL: ICD-10-CM

## 2021-09-15 RX ORDER — TRAMADOL HYDROCHLORIDE 50 MG/1
TABLET ORAL
Qty: 84 TABLET | Refills: 0 | Status: SHIPPED | OUTPATIENT
Start: 2021-09-15 | End: 2021-10-05

## 2021-10-04 DIAGNOSIS — M77.50 ENTHESOPATHY OF ANKLE AND TARSUS: ICD-10-CM

## 2021-10-04 DIAGNOSIS — M25.572 SINUS TARSI SYNDROME OF LEFT ANKLE: ICD-10-CM

## 2021-10-04 DIAGNOSIS — G89.29 CHRONIC PAIN OF TOES OF BOTH FEET: ICD-10-CM

## 2021-10-04 DIAGNOSIS — M79.674 CHRONIC PAIN OF TOES OF BOTH FEET: ICD-10-CM

## 2021-10-04 DIAGNOSIS — M79.675 CHRONIC PAIN OF TOES OF BOTH FEET: ICD-10-CM

## 2021-10-04 DIAGNOSIS — M25.571 SINUS TARSI SYNDROME OF RIGHT ANKLE: ICD-10-CM

## 2021-10-04 DIAGNOSIS — M72.2 PLANTAR FASCIAL FIBROMATOSIS OF LEFT FOOT: ICD-10-CM

## 2021-10-04 DIAGNOSIS — M72.2 PLANTAR FASCIITIS, BILATERAL: ICD-10-CM

## 2021-10-05 RX ORDER — TRAMADOL HYDROCHLORIDE 50 MG/1
TABLET ORAL
Qty: 84 TABLET | Refills: 0 | Status: SHIPPED | OUTPATIENT
Start: 2021-10-05 | End: 2021-10-27

## 2021-10-26 ENCOUNTER — TELEPHONE (OUTPATIENT)
Dept: PODIATRY | Age: 46
End: 2021-10-26

## 2021-10-26 DIAGNOSIS — M77.50 ENTHESOPATHY OF ANKLE AND TARSUS: ICD-10-CM

## 2021-10-26 DIAGNOSIS — M72.2 PLANTAR FASCIAL FIBROMATOSIS OF LEFT FOOT: ICD-10-CM

## 2021-10-26 DIAGNOSIS — M72.2 PLANTAR FASCIITIS, BILATERAL: ICD-10-CM

## 2021-10-26 DIAGNOSIS — M79.675 CHRONIC PAIN OF TOES OF BOTH FEET: ICD-10-CM

## 2021-10-26 DIAGNOSIS — M25.571 SINUS TARSI SYNDROME OF RIGHT ANKLE: ICD-10-CM

## 2021-10-26 DIAGNOSIS — M25.572 SINUS TARSI SYNDROME OF LEFT ANKLE: ICD-10-CM

## 2021-10-26 DIAGNOSIS — M79.674 CHRONIC PAIN OF TOES OF BOTH FEET: ICD-10-CM

## 2021-10-26 DIAGNOSIS — G89.29 CHRONIC PAIN OF TOES OF BOTH FEET: ICD-10-CM

## 2021-10-26 DIAGNOSIS — M72.2 PLANTAR FASCIITIS, BILATERAL: Primary | ICD-10-CM

## 2021-10-27 RX ORDER — TRAMADOL HYDROCHLORIDE 50 MG/1
TABLET ORAL
Qty: 84 TABLET | Refills: 0 | Status: SHIPPED | OUTPATIENT
Start: 2021-10-27 | End: 2021-11-17

## 2021-11-16 DIAGNOSIS — M25.572 SINUS TARSI SYNDROME OF LEFT ANKLE: ICD-10-CM

## 2021-11-16 DIAGNOSIS — M79.674 CHRONIC PAIN OF TOES OF BOTH FEET: ICD-10-CM

## 2021-11-16 DIAGNOSIS — M72.2 PLANTAR FASCIITIS, BILATERAL: ICD-10-CM

## 2021-11-16 DIAGNOSIS — M72.2 PLANTAR FASCIAL FIBROMATOSIS OF LEFT FOOT: ICD-10-CM

## 2021-11-16 DIAGNOSIS — G89.29 CHRONIC PAIN OF TOES OF BOTH FEET: ICD-10-CM

## 2021-11-16 DIAGNOSIS — M25.571 SINUS TARSI SYNDROME OF RIGHT ANKLE: ICD-10-CM

## 2021-11-16 DIAGNOSIS — M77.50 ENTHESOPATHY OF ANKLE AND TARSUS: ICD-10-CM

## 2021-11-16 DIAGNOSIS — M79.675 CHRONIC PAIN OF TOES OF BOTH FEET: ICD-10-CM

## 2021-11-17 RX ORDER — TRAMADOL HYDROCHLORIDE 50 MG/1
TABLET ORAL
Qty: 84 TABLET | Refills: 0 | Status: SHIPPED | OUTPATIENT
Start: 2021-11-17 | End: 2021-12-09

## 2021-11-17 NOTE — TELEPHONE ENCOUNTER
Patient is requesting refill for tramadol. Hasn't been seen in office since 6/25/2020. Please approve or refuse.

## 2021-12-09 DIAGNOSIS — M25.571 SINUS TARSI SYNDROME OF RIGHT ANKLE: ICD-10-CM

## 2021-12-09 DIAGNOSIS — G89.29 CHRONIC PAIN OF TOES OF BOTH FEET: ICD-10-CM

## 2021-12-09 DIAGNOSIS — M72.2 PLANTAR FASCIITIS, BILATERAL: ICD-10-CM

## 2021-12-09 DIAGNOSIS — M79.674 CHRONIC PAIN OF TOES OF BOTH FEET: ICD-10-CM

## 2021-12-09 DIAGNOSIS — M79.675 CHRONIC PAIN OF TOES OF BOTH FEET: ICD-10-CM

## 2021-12-09 DIAGNOSIS — M77.50 ENTHESOPATHY OF ANKLE AND TARSUS: ICD-10-CM

## 2021-12-09 DIAGNOSIS — M25.572 SINUS TARSI SYNDROME OF LEFT ANKLE: ICD-10-CM

## 2021-12-09 DIAGNOSIS — M72.2 PLANTAR FASCIAL FIBROMATOSIS OF LEFT FOOT: ICD-10-CM

## 2021-12-09 RX ORDER — TRAMADOL HYDROCHLORIDE 50 MG/1
TABLET ORAL
Qty: 84 TABLET | Refills: 0 | Status: SHIPPED | OUTPATIENT
Start: 2021-12-09 | End: 2021-12-30 | Stop reason: SDUPTHER

## 2021-12-29 ENCOUNTER — TELEPHONE (OUTPATIENT)
Dept: ADMINISTRATIVE | Age: 46
End: 2021-12-29

## 2021-12-29 DIAGNOSIS — M77.50 ENTHESOPATHY OF ANKLE AND TARSUS: ICD-10-CM

## 2021-12-29 DIAGNOSIS — M79.674 CHRONIC PAIN OF TOES OF BOTH FEET: ICD-10-CM

## 2021-12-29 DIAGNOSIS — M72.2 PLANTAR FASCIAL FIBROMATOSIS OF LEFT FOOT: ICD-10-CM

## 2021-12-29 DIAGNOSIS — M25.571 SINUS TARSI SYNDROME OF RIGHT ANKLE: ICD-10-CM

## 2021-12-29 DIAGNOSIS — M79.675 CHRONIC PAIN OF TOES OF BOTH FEET: ICD-10-CM

## 2021-12-29 DIAGNOSIS — M25.572 SINUS TARSI SYNDROME OF LEFT ANKLE: ICD-10-CM

## 2021-12-29 DIAGNOSIS — G89.29 CHRONIC PAIN OF TOES OF BOTH FEET: ICD-10-CM

## 2021-12-29 DIAGNOSIS — M72.2 PLANTAR FASCIITIS, BILATERAL: ICD-10-CM

## 2021-12-29 NOTE — TELEPHONE ENCOUNTER
Patient's girlfriend is requesting a refill of tramadol be sent to [facility] , please call [phone] , unable to reach office to transfer call, psc/sc

## 2021-12-30 DIAGNOSIS — M72.2 PLANTAR FASCIAL FIBROMATOSIS OF LEFT FOOT: ICD-10-CM

## 2021-12-30 DIAGNOSIS — G89.29 CHRONIC PAIN OF TOES OF BOTH FEET: ICD-10-CM

## 2021-12-30 DIAGNOSIS — M79.674 CHRONIC PAIN OF TOES OF BOTH FEET: ICD-10-CM

## 2021-12-30 DIAGNOSIS — M79.675 CHRONIC PAIN OF TOES OF BOTH FEET: ICD-10-CM

## 2021-12-30 DIAGNOSIS — M72.2 PLANTAR FASCIITIS, BILATERAL: ICD-10-CM

## 2021-12-30 DIAGNOSIS — M25.571 SINUS TARSI SYNDROME OF RIGHT ANKLE: ICD-10-CM

## 2021-12-30 DIAGNOSIS — M77.50 ENTHESOPATHY OF ANKLE AND TARSUS: ICD-10-CM

## 2021-12-30 DIAGNOSIS — M25.572 SINUS TARSI SYNDROME OF LEFT ANKLE: ICD-10-CM

## 2021-12-30 RX ORDER — TRAMADOL HYDROCHLORIDE 50 MG/1
50 TABLET ORAL EVERY 6 HOURS PRN
Qty: 84 TABLET | Refills: 0 | Status: SHIPPED | OUTPATIENT
Start: 2021-12-30 | End: 2022-01-20 | Stop reason: SDUPTHER

## 2022-01-20 ENCOUNTER — OFFICE VISIT (OUTPATIENT)
Dept: PODIATRY | Age: 47
End: 2022-01-20
Payer: COMMERCIAL

## 2022-01-20 VITALS — BODY MASS INDEX: 33.23 KG/M2 | HEIGHT: 72 IN

## 2022-01-20 DIAGNOSIS — M72.2 PLANTAR FASCIITIS, BILATERAL: Primary | ICD-10-CM

## 2022-01-20 DIAGNOSIS — M79.675 CHRONIC PAIN OF TOES OF BOTH FEET: ICD-10-CM

## 2022-01-20 DIAGNOSIS — M77.50 ENTHESOPATHY OF ANKLE AND TARSUS: ICD-10-CM

## 2022-01-20 DIAGNOSIS — G89.29 CHRONIC PAIN OF TOES OF BOTH FEET: ICD-10-CM

## 2022-01-20 DIAGNOSIS — M25.571 SINUS TARSI SYNDROME OF RIGHT ANKLE: ICD-10-CM

## 2022-01-20 DIAGNOSIS — M25.572 SINUS TARSI SYNDROME OF LEFT ANKLE: ICD-10-CM

## 2022-01-20 DIAGNOSIS — M72.2 PLANTAR FASCIAL FIBROMATOSIS OF LEFT FOOT: ICD-10-CM

## 2022-01-20 DIAGNOSIS — M79.674 CHRONIC PAIN OF TOES OF BOTH FEET: ICD-10-CM

## 2022-01-20 PROCEDURE — 99214 OFFICE O/P EST MOD 30 MIN: CPT | Performed by: PODIATRIST

## 2022-01-20 PROCEDURE — 20605 DRAIN/INJ JOINT/BURSA W/O US: CPT | Performed by: PODIATRIST

## 2022-01-20 RX ORDER — BUPIVACAINE HYDROCHLORIDE 5 MG/ML
1 INJECTION, SOLUTION PERINEURAL ONCE
Status: COMPLETED | OUTPATIENT
Start: 2022-01-20 | End: 2022-01-20

## 2022-01-20 RX ORDER — TRAMADOL HYDROCHLORIDE 50 MG/1
50 TABLET ORAL EVERY 6 HOURS PRN
Qty: 84 TABLET | Refills: 0 | Status: SHIPPED | OUTPATIENT
Start: 2022-01-20 | End: 2022-02-10

## 2022-01-20 RX ORDER — TESTOSTERONE CYPIONATE 200 MG/ML
6 INJECTION INTRAMUSCULAR ONCE
Status: COMPLETED | OUTPATIENT
Start: 2022-01-20 | End: 2022-01-20

## 2022-01-20 RX ADMIN — BUPIVACAINE HYDROCHLORIDE 5 MG: 5 INJECTION, SOLUTION PERINEURAL at 17:00

## 2022-01-20 RX ADMIN — TESTOSTERONE CYPIONATE 6 MG: 200 INJECTION INTRAMUSCULAR at 17:00

## 2022-01-20 ASSESSMENT — ENCOUNTER SYMPTOMS
COLOR CHANGE: 0
DIARRHEA: 0
BACK PAIN: 1
CONSTIPATION: 0
NAUSEA: 0
VOMITING: 0

## 2022-01-20 NOTE — PROGRESS NOTES
[facility]  Return Patient     Chele Lindsay 55 y.o. male that presents for follow-up of   Chief Complaint   Patient presents with    Ankle Pain     b/l foot/ankle pain      Most of his pain today is in the ankle, side area, right ankle, not as much in the left ankle. He is taking Ultram daily to get through work. I did refer him to pain management, in the past.   He has a history of severe foot and ankle pain. He used to wear orthotics everyday and he tries to get new boots. He has been icing. No injury. 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 Currently denies F/C/N/V. No Known Allergies    History reviewed. No pertinent past medical history. Prior to Admission medications    Medication Sig Start Date End Date Taking? Authorizing Provider   traMADol (ULTRAM) 50 MG tablet Take 1 tablet by mouth every 6 hours as needed for Pain for up to 30 days. 1/20/22 2/19/22 Yes Sherren Chambers, DPM   naloxone 4 MG/0.1ML LIQD nasal spray 1 spray by Nasal route as needed for Opioid Reversal  Patient not taking: Reported on 1/20/2022 1/14/20   Sherren Chambers, DPM       Review of Systems   Constitutional: Negative for chills, diaphoresis, fatigue, fever and unexpected weight change. Cardiovascular: Negative for leg swelling. Gastrointestinal: Negative for constipation, diarrhea, nausea and vomiting. Musculoskeletal: Positive for back pain. Negative for arthralgias, gait problem and joint swelling. Skin: Negative for color change, pallor, rash and wound. Neurological: Negative for weakness and numbness. Lower Extremity Physical Examination:     Vitals: There were no vitals filed for this visit. General: AAO x 3 in NAD.      Integument:   Warm, dry, supple, Bilateral.  Open lesion absent, Bilateral.      Vascular: DP and PT pulses palpable 2/4, Bilateral.  CFT <3 seconds, Bilateral.  Hair growth present to the level of the digits, Bilateral.  Edema absent, Bilateral. Varicosities absent, Bilateral. Erythema absent, Bilateral    Neurological:  Sensation present to light touch to level of digits, Bilateral.      Musculoskeletal: Muscle strength 5/5, Bilateral.  Pain present upon palpation of central left arch with a palpable nodule within the plantar fascia, Left, small nodule noted on the right, minimal pain. Neil Brittle decreased medial longitudinal arch, Bilateral.  Ankle ROM normal,Bilateral.  Pain bilateral sinus tarsi, right most severe      Asessment: Patient is a 55 y.o. male with:   1. Plantar fasciitis, bilateral    2. Enthesopathy of ankle and tarsus    3. Plantar fascial fibromatosis of left foot    4. Chronic pain of toes of both feet    5. Sinus tarsi syndrome of right ankle    6. Sinus tarsi syndrome of left ankle        Plan: Patient examined and evaluated. Current condition and treatment options discussed in detail. Advised pt to continue with orthotics and new boots. Verbal and written instructions given to patient. Contact office with any questions/problems/concerns. 1) Ice, massage, orthotics. 2) continue Ultram  3) Injection Type: Intermediate Joint/Bursa    Anatomic Location: right sinus tarsi    Verbal consent obtained from patient for injection after all questions answered. Right foot palpated and most tender location identified. This area was prepped with an alcohol swab and 3 cc total of 1cc 0.5% marcaine plain, 1 cc dexamethasone phosphate, and 1 cc depo medrol was injected to the right. A band-aid was applied, patient advised of possible local steroid reaction symptoms, and patient instructed to limit activities to this area for 24 hours. Medications ordered during this encounter were injected into the right.      Orders Placed This Encounter   Procedures    [place] ARTHROCENTESIS ASPIR&/INJ INTERM JT/BURS W/O US     Orders Placed This Encounter   Medications    traMADol (ULTRAM) 50 MG tablet     Sig: Take 1 tablet by mouth every 6 hours as needed for Pain for up to 30 days. Dispense:  84 tablet     Refill:  0     Reduce doses taken as pain becomes manageable    bupivacaine (MARCAINE) 0.5 % injection 5 mg    Dexamethasone Sodium Phosphate injection 6 mg    methylPREDNISolone acetate (DEPO-MEDROL) injection 10 mg     Controlled Substances Monitoring:          Chronic Pain Management  1. Plantar fasciitis, bilateral    2. Enthesopathy of ankle and tarsus    3. Plantar fascial fibromatosis of left foot    4. Chronic pain of toes of both feet    5. Sinus tarsi syndrome of right ankle    6. Sinus tarsi syndrome of left ankle      1) non-medication and non-opioid treatments discussed with the patient. Previous treatments include: orthotics, new boots, NSAIDS, physical therapy    2) Laboratory or diagnostic testing   Was a urine drug test performed: No    3) OARRS check   Controlled Substances Monitoring:     RX Monitoring 8/24/2021   Attestation -   Periodic Controlled Substance Monitoring Possible medication side effects, risk of tolerance/dependence & alternative treatments discussed. ;No signs of potential drug abuse or diversion identified. ;Assessed functional status. ;Obtaining appropriate analgesic effect of treatment. Chronic Pain > 50 MEDD -   Chronic Pain > 80 MEDD -       4)  Objective goals for treatment: Goals of Therapy: Decrease in pain level The current treatment includes non-medication and not-opioid treatments documented above. Opioid treatment is needed for this patient to achieve necessary activities of daily living including, but not limited to,  ambulating around their residence, preparing food, light house keeping, and self care including bathing and grooming. The planned duration of treatment is 17 months. 5) This patient will be assessed monthly. Progress toward goal: Continue present management    The following were discussed with the patient.      Benefits and risks of the medication, including potential for addiction and risk of overdose were discussed with the patient. We discussed that it is the patients responsibility to safely store and appropriately dispose of the medication. Permission for drug screening and release to speak with other practitioners concerning the patients condition or treatment; Cooperation with pill counts or other checks designed to assure compliance with the treatment plan and to minimize the risk of misuse or diversion; The understanding that the patient shall only receive opioid medications from the physician treating the chronic pain unless there is written agreement among all of the prescribers of opioids outlining the responsibilities and boundaries of prescribing for the patient; and The understanding that the dosage may be tapered if not effective or if the patient does not abide by the treatment agreement. Dosage will not exceed one hundred twenty MED per day.   Is prescription for >120 pills per month: No      RTC in 2-3 months  1/20/2022

## 2022-02-08 DIAGNOSIS — M25.571 SINUS TARSI SYNDROME OF RIGHT ANKLE: ICD-10-CM

## 2022-02-08 DIAGNOSIS — G89.29 CHRONIC PAIN OF TOES OF BOTH FEET: ICD-10-CM

## 2022-02-08 DIAGNOSIS — M25.572 SINUS TARSI SYNDROME OF LEFT ANKLE: ICD-10-CM

## 2022-02-08 DIAGNOSIS — M79.674 CHRONIC PAIN OF TOES OF BOTH FEET: ICD-10-CM

## 2022-02-08 DIAGNOSIS — M77.50 ENTHESOPATHY OF ANKLE AND TARSUS: ICD-10-CM

## 2022-02-08 DIAGNOSIS — M72.2 PLANTAR FASCIITIS, BILATERAL: ICD-10-CM

## 2022-02-08 DIAGNOSIS — M79.675 CHRONIC PAIN OF TOES OF BOTH FEET: ICD-10-CM

## 2022-02-08 DIAGNOSIS — M72.2 PLANTAR FASCIAL FIBROMATOSIS OF LEFT FOOT: ICD-10-CM

## 2022-02-10 RX ORDER — TRAMADOL HYDROCHLORIDE 50 MG/1
TABLET ORAL
Qty: 84 TABLET | Refills: 0 | Status: SHIPPED | OUTPATIENT
Start: 2022-02-10 | End: 2022-03-04

## 2022-03-02 DIAGNOSIS — M72.2 PLANTAR FASCIAL FIBROMATOSIS OF LEFT FOOT: ICD-10-CM

## 2022-03-02 DIAGNOSIS — M25.571 SINUS TARSI SYNDROME OF RIGHT ANKLE: ICD-10-CM

## 2022-03-02 DIAGNOSIS — M72.2 PLANTAR FASCIITIS, BILATERAL: ICD-10-CM

## 2022-03-02 DIAGNOSIS — M79.675 CHRONIC PAIN OF TOES OF BOTH FEET: ICD-10-CM

## 2022-03-02 DIAGNOSIS — M77.50 ENTHESOPATHY OF ANKLE AND TARSUS: ICD-10-CM

## 2022-03-02 DIAGNOSIS — M79.674 CHRONIC PAIN OF TOES OF BOTH FEET: ICD-10-CM

## 2022-03-02 DIAGNOSIS — G89.29 CHRONIC PAIN OF TOES OF BOTH FEET: ICD-10-CM

## 2022-03-02 DIAGNOSIS — M25.572 SINUS TARSI SYNDROME OF LEFT ANKLE: ICD-10-CM

## 2022-03-04 RX ORDER — TRAMADOL HYDROCHLORIDE 50 MG/1
TABLET ORAL
Qty: 84 TABLET | Refills: 0 | Status: SHIPPED | OUTPATIENT
Start: 2022-03-04 | End: 2022-03-29

## 2022-03-28 DIAGNOSIS — M25.571 SINUS TARSI SYNDROME OF RIGHT ANKLE: ICD-10-CM

## 2022-03-28 DIAGNOSIS — G89.29 CHRONIC PAIN OF TOES OF BOTH FEET: ICD-10-CM

## 2022-03-28 DIAGNOSIS — M79.674 CHRONIC PAIN OF TOES OF BOTH FEET: ICD-10-CM

## 2022-03-28 DIAGNOSIS — M25.572 SINUS TARSI SYNDROME OF LEFT ANKLE: ICD-10-CM

## 2022-03-28 DIAGNOSIS — M77.50 ENTHESOPATHY OF ANKLE AND TARSUS: ICD-10-CM

## 2022-03-28 DIAGNOSIS — M72.2 PLANTAR FASCIITIS, BILATERAL: ICD-10-CM

## 2022-03-28 DIAGNOSIS — M79.675 CHRONIC PAIN OF TOES OF BOTH FEET: ICD-10-CM

## 2022-03-28 DIAGNOSIS — M72.2 PLANTAR FASCIAL FIBROMATOSIS OF LEFT FOOT: ICD-10-CM

## 2022-03-29 RX ORDER — TRAMADOL HYDROCHLORIDE 50 MG/1
TABLET ORAL
Qty: 84 TABLET | Refills: 0 | Status: SHIPPED | OUTPATIENT
Start: 2022-03-29 | End: 2022-04-18

## 2022-04-18 DIAGNOSIS — M25.572 SINUS TARSI SYNDROME OF LEFT ANKLE: ICD-10-CM

## 2022-04-18 DIAGNOSIS — M79.675 CHRONIC PAIN OF TOES OF BOTH FEET: ICD-10-CM

## 2022-04-18 DIAGNOSIS — M72.2 PLANTAR FASCIAL FIBROMATOSIS OF LEFT FOOT: ICD-10-CM

## 2022-04-18 DIAGNOSIS — G89.29 CHRONIC PAIN OF TOES OF BOTH FEET: ICD-10-CM

## 2022-04-18 DIAGNOSIS — M72.2 PLANTAR FASCIITIS, BILATERAL: ICD-10-CM

## 2022-04-18 DIAGNOSIS — M77.50 ENTHESOPATHY OF ANKLE AND TARSUS: ICD-10-CM

## 2022-04-18 DIAGNOSIS — M25.571 SINUS TARSI SYNDROME OF RIGHT ANKLE: ICD-10-CM

## 2022-04-18 DIAGNOSIS — M79.674 CHRONIC PAIN OF TOES OF BOTH FEET: ICD-10-CM

## 2022-04-18 RX ORDER — TRAMADOL HYDROCHLORIDE 50 MG/1
TABLET ORAL
Qty: 84 TABLET | Refills: 0 | Status: SHIPPED | OUTPATIENT
Start: 2022-04-18 | End: 2022-05-11

## 2022-04-18 NOTE — TELEPHONE ENCOUNTER
Jeffrey Vega is requesting a refill on the following medications:   Requested Prescriptions     Pending Prescriptions Disp Refills    traMADol (ULTRAM) 50 MG tablet [Pharmacy Med Name: traMADol HCL 50MG TABLET] 84 tablet      Sig: TAKE ONE TABLET BY MOUTH EVERY 6 HOURS AS NEEDED FOR PAIN       Last OV 1/20/22    No future appointments. Please approve or refuse.

## 2022-05-10 DIAGNOSIS — M79.675 CHRONIC PAIN OF TOES OF BOTH FEET: ICD-10-CM

## 2022-05-10 DIAGNOSIS — M25.572 SINUS TARSI SYNDROME OF LEFT ANKLE: ICD-10-CM

## 2022-05-10 DIAGNOSIS — G89.29 CHRONIC PAIN OF TOES OF BOTH FEET: ICD-10-CM

## 2022-05-10 DIAGNOSIS — M25.571 SINUS TARSI SYNDROME OF RIGHT ANKLE: ICD-10-CM

## 2022-05-10 DIAGNOSIS — M72.2 PLANTAR FASCIITIS, BILATERAL: ICD-10-CM

## 2022-05-10 DIAGNOSIS — M79.674 CHRONIC PAIN OF TOES OF BOTH FEET: ICD-10-CM

## 2022-05-10 DIAGNOSIS — M72.2 PLANTAR FASCIAL FIBROMATOSIS OF LEFT FOOT: ICD-10-CM

## 2022-05-10 DIAGNOSIS — M77.50 ENTHESOPATHY OF ANKLE AND TARSUS: ICD-10-CM

## 2022-05-10 NOTE — TELEPHONE ENCOUNTER
Please Approve or Refuse.     Next Visit Date:  Visit date not found   Last Visit Date: 1/20/2022    No results found for: LABA1C          ( goal A1C is < 7)   BP Readings from Last 3 Encounters:   02/01/18 (!) 180/126   01/09/17 (!) 142/93   10/03/16 (!) 139/96          (goal 120/80)  BUN   Date Value Ref Range Status   01/30/2015 14 6 - 20 mg/dL Final     CREATININE   Date Value Ref Range Status   01/30/2015 0.71 0.70 - 1.20 mg/dL Final     Potassium   Date Value Ref Range Status   01/30/2015 3.7 3.7 - 5.3 mmol/L Final

## 2022-05-11 RX ORDER — TRAMADOL HYDROCHLORIDE 50 MG/1
TABLET ORAL
Qty: 84 TABLET | Refills: 0 | Status: SHIPPED | OUTPATIENT
Start: 2022-05-11 | End: 2022-05-31

## 2022-05-31 DIAGNOSIS — M25.572 SINUS TARSI SYNDROME OF LEFT ANKLE: ICD-10-CM

## 2022-05-31 DIAGNOSIS — M79.675 CHRONIC PAIN OF TOES OF BOTH FEET: ICD-10-CM

## 2022-05-31 DIAGNOSIS — G89.29 CHRONIC PAIN OF TOES OF BOTH FEET: ICD-10-CM

## 2022-05-31 DIAGNOSIS — M79.674 CHRONIC PAIN OF TOES OF BOTH FEET: ICD-10-CM

## 2022-05-31 DIAGNOSIS — M25.571 SINUS TARSI SYNDROME OF RIGHT ANKLE: ICD-10-CM

## 2022-05-31 DIAGNOSIS — M72.2 PLANTAR FASCIITIS, BILATERAL: ICD-10-CM

## 2022-05-31 DIAGNOSIS — M77.50 ENTHESOPATHY OF ANKLE AND TARSUS: ICD-10-CM

## 2022-05-31 DIAGNOSIS — M72.2 PLANTAR FASCIAL FIBROMATOSIS OF LEFT FOOT: ICD-10-CM

## 2022-05-31 RX ORDER — TRAMADOL HYDROCHLORIDE 50 MG/1
TABLET ORAL
Qty: 84 TABLET | Refills: 0 | Status: SHIPPED | OUTPATIENT
Start: 2022-05-31 | End: 2022-06-21

## 2022-06-20 DIAGNOSIS — M77.50 ENTHESOPATHY OF ANKLE AND TARSUS: ICD-10-CM

## 2022-06-20 DIAGNOSIS — M79.674 CHRONIC PAIN OF TOES OF BOTH FEET: ICD-10-CM

## 2022-06-20 DIAGNOSIS — G89.29 CHRONIC PAIN OF TOES OF BOTH FEET: ICD-10-CM

## 2022-06-20 DIAGNOSIS — M79.675 CHRONIC PAIN OF TOES OF BOTH FEET: ICD-10-CM

## 2022-06-20 DIAGNOSIS — M72.2 PLANTAR FASCIITIS, BILATERAL: ICD-10-CM

## 2022-06-20 DIAGNOSIS — M25.571 SINUS TARSI SYNDROME OF RIGHT ANKLE: ICD-10-CM

## 2022-06-20 DIAGNOSIS — M25.572 SINUS TARSI SYNDROME OF LEFT ANKLE: ICD-10-CM

## 2022-06-20 DIAGNOSIS — M72.2 PLANTAR FASCIAL FIBROMATOSIS OF LEFT FOOT: ICD-10-CM

## 2022-06-21 RX ORDER — TRAMADOL HYDROCHLORIDE 50 MG/1
TABLET ORAL
Qty: 84 TABLET | Refills: 0 | Status: SHIPPED | OUTPATIENT
Start: 2022-06-21 | End: 2022-07-15

## 2022-07-11 DIAGNOSIS — M77.50 ENTHESOPATHY OF ANKLE AND TARSUS: ICD-10-CM

## 2022-07-11 DIAGNOSIS — M79.675 CHRONIC PAIN OF TOES OF BOTH FEET: ICD-10-CM

## 2022-07-11 DIAGNOSIS — M25.571 SINUS TARSI SYNDROME OF RIGHT ANKLE: ICD-10-CM

## 2022-07-11 DIAGNOSIS — M72.2 PLANTAR FASCIITIS, BILATERAL: ICD-10-CM

## 2022-07-11 DIAGNOSIS — M79.674 CHRONIC PAIN OF TOES OF BOTH FEET: ICD-10-CM

## 2022-07-11 DIAGNOSIS — M72.2 PLANTAR FASCIAL FIBROMATOSIS OF LEFT FOOT: ICD-10-CM

## 2022-07-11 DIAGNOSIS — M25.572 SINUS TARSI SYNDROME OF LEFT ANKLE: ICD-10-CM

## 2022-07-11 DIAGNOSIS — G89.29 CHRONIC PAIN OF TOES OF BOTH FEET: ICD-10-CM

## 2022-07-15 RX ORDER — TRAMADOL HYDROCHLORIDE 50 MG/1
TABLET ORAL
Qty: 84 TABLET | Refills: 0 | Status: SHIPPED | OUTPATIENT
Start: 2022-07-15 | End: 2022-08-08

## 2022-07-18 NOTE — TELEPHONE ENCOUNTER
[facility]  Check-In    Current Estimated Gestational Age: 33w2d    Questions/Answers    Flowsheet Row Responses   Demographics Reviewed Yes   Able to keep appointments as scheduled No  [noted several appointments missed and never had follow up US since 16 weeks]   Resource/Environmental Concerns None   Do you have any questions related to your care experience, your pregnancy, plans for delivery, any concerns, etc? No          States has chosen pediatrician. Going to breast feed and has RX for pump, instructed to notify if has any issues.     Yareli Huerta, RN  Maternity Nurse Navigator    7/18/2022, 11:23 EDT         Pharmacy has pended medication for approval if appropriate.

## 2022-08-08 DIAGNOSIS — M72.2 PLANTAR FASCIITIS, BILATERAL: ICD-10-CM

## 2022-08-08 DIAGNOSIS — M77.50 ENTHESOPATHY OF ANKLE AND TARSUS: ICD-10-CM

## 2022-08-08 DIAGNOSIS — M25.571 SINUS TARSI SYNDROME OF RIGHT ANKLE: ICD-10-CM

## 2022-08-08 DIAGNOSIS — M79.674 CHRONIC PAIN OF TOES OF BOTH FEET: ICD-10-CM

## 2022-08-08 DIAGNOSIS — G89.29 CHRONIC PAIN OF TOES OF BOTH FEET: ICD-10-CM

## 2022-08-08 DIAGNOSIS — M25.572 SINUS TARSI SYNDROME OF LEFT ANKLE: ICD-10-CM

## 2022-08-08 DIAGNOSIS — M79.675 CHRONIC PAIN OF TOES OF BOTH FEET: ICD-10-CM

## 2022-08-08 DIAGNOSIS — M72.2 PLANTAR FASCIAL FIBROMATOSIS OF LEFT FOOT: ICD-10-CM

## 2022-08-08 RX ORDER — TRAMADOL HYDROCHLORIDE 50 MG/1
TABLET ORAL
Qty: 84 TABLET | Refills: 0 | Status: SHIPPED | OUTPATIENT
Start: 2022-08-08 | End: 2022-08-30

## 2022-08-29 DIAGNOSIS — M25.572 SINUS TARSI SYNDROME OF LEFT ANKLE: ICD-10-CM

## 2022-08-29 DIAGNOSIS — G89.29 CHRONIC PAIN OF TOES OF BOTH FEET: ICD-10-CM

## 2022-08-29 DIAGNOSIS — M72.2 PLANTAR FASCIAL FIBROMATOSIS OF LEFT FOOT: ICD-10-CM

## 2022-08-29 DIAGNOSIS — M79.675 CHRONIC PAIN OF TOES OF BOTH FEET: ICD-10-CM

## 2022-08-29 DIAGNOSIS — M79.674 CHRONIC PAIN OF TOES OF BOTH FEET: ICD-10-CM

## 2022-08-29 DIAGNOSIS — M25.571 SINUS TARSI SYNDROME OF RIGHT ANKLE: ICD-10-CM

## 2022-08-29 DIAGNOSIS — M77.50 ENTHESOPATHY OF ANKLE AND TARSUS: ICD-10-CM

## 2022-08-29 DIAGNOSIS — M72.2 PLANTAR FASCIITIS, BILATERAL: ICD-10-CM

## 2022-08-30 RX ORDER — TRAMADOL HYDROCHLORIDE 50 MG/1
TABLET ORAL
Qty: 84 TABLET | Refills: 0 | Status: SHIPPED | OUTPATIENT
Start: 2022-08-30 | End: 2022-09-19

## 2022-09-19 DIAGNOSIS — M25.571 SINUS TARSI SYNDROME OF RIGHT ANKLE: ICD-10-CM

## 2022-09-19 DIAGNOSIS — M25.572 SINUS TARSI SYNDROME OF LEFT ANKLE: ICD-10-CM

## 2022-09-19 DIAGNOSIS — M79.674 CHRONIC PAIN OF TOES OF BOTH FEET: ICD-10-CM

## 2022-09-19 DIAGNOSIS — M77.50 ENTHESOPATHY OF ANKLE AND TARSUS: ICD-10-CM

## 2022-09-19 DIAGNOSIS — M79.675 CHRONIC PAIN OF TOES OF BOTH FEET: ICD-10-CM

## 2022-09-19 DIAGNOSIS — M72.2 PLANTAR FASCIAL FIBROMATOSIS OF LEFT FOOT: ICD-10-CM

## 2022-09-19 DIAGNOSIS — M72.2 PLANTAR FASCIITIS, BILATERAL: ICD-10-CM

## 2022-09-19 DIAGNOSIS — G89.29 CHRONIC PAIN OF TOES OF BOTH FEET: ICD-10-CM

## 2022-09-19 RX ORDER — TRAMADOL HYDROCHLORIDE 50 MG/1
TABLET ORAL
Qty: 84 TABLET | Refills: 0 | Status: SHIPPED | OUTPATIENT
Start: 2022-09-19 | End: 2022-10-10

## 2022-10-10 DIAGNOSIS — M77.50 ENTHESOPATHY OF ANKLE AND TARSUS: ICD-10-CM

## 2022-10-10 DIAGNOSIS — G89.29 CHRONIC PAIN OF TOES OF BOTH FEET: ICD-10-CM

## 2022-10-10 DIAGNOSIS — M25.572 SINUS TARSI SYNDROME OF LEFT ANKLE: ICD-10-CM

## 2022-10-10 DIAGNOSIS — M79.675 CHRONIC PAIN OF TOES OF BOTH FEET: ICD-10-CM

## 2022-10-10 DIAGNOSIS — M72.2 PLANTAR FASCIITIS, BILATERAL: ICD-10-CM

## 2022-10-10 DIAGNOSIS — M72.2 PLANTAR FASCIAL FIBROMATOSIS OF LEFT FOOT: ICD-10-CM

## 2022-10-10 DIAGNOSIS — M25.571 SINUS TARSI SYNDROME OF RIGHT ANKLE: ICD-10-CM

## 2022-10-10 DIAGNOSIS — M79.674 CHRONIC PAIN OF TOES OF BOTH FEET: ICD-10-CM

## 2022-10-10 RX ORDER — TRAMADOL HYDROCHLORIDE 50 MG/1
TABLET ORAL
Qty: 84 TABLET | Refills: 0 | Status: SHIPPED | OUTPATIENT
Start: 2022-10-10 | End: 2022-11-01

## 2022-10-31 DIAGNOSIS — M25.572 SINUS TARSI SYNDROME OF LEFT ANKLE: ICD-10-CM

## 2022-10-31 DIAGNOSIS — M72.2 PLANTAR FASCIITIS, BILATERAL: ICD-10-CM

## 2022-10-31 DIAGNOSIS — M77.50 ENTHESOPATHY OF ANKLE AND TARSUS: ICD-10-CM

## 2022-10-31 DIAGNOSIS — M79.674 CHRONIC PAIN OF TOES OF BOTH FEET: ICD-10-CM

## 2022-10-31 DIAGNOSIS — M79.675 CHRONIC PAIN OF TOES OF BOTH FEET: ICD-10-CM

## 2022-10-31 DIAGNOSIS — M72.2 PLANTAR FASCIAL FIBROMATOSIS OF LEFT FOOT: ICD-10-CM

## 2022-10-31 DIAGNOSIS — G89.29 CHRONIC PAIN OF TOES OF BOTH FEET: ICD-10-CM

## 2022-10-31 DIAGNOSIS — M25.571 SINUS TARSI SYNDROME OF RIGHT ANKLE: ICD-10-CM

## 2022-10-31 NOTE — TELEPHONE ENCOUNTER
Cleveland Forrester is requesting a refill on the following medications:   Requested Prescriptions     Pending Prescriptions Disp Refills    traMADol (ULTRAM) 50 MG tablet [Pharmacy Med Name: traMADol HCL 50MG TABLET] 84 tablet      Sig: TAKE ONE TABLET BY MOUTH EVERY 6 HOURS AS NEEDED FOR PAIN       Last OV 10/22    No future appointments.     Please approve or refuse

## 2022-11-01 RX ORDER — TRAMADOL HYDROCHLORIDE 50 MG/1
TABLET ORAL
Qty: 84 TABLET | Refills: 0 | Status: SHIPPED | OUTPATIENT
Start: 2022-11-01 | End: 2022-11-22 | Stop reason: SDUPTHER

## 2022-11-21 ENCOUNTER — TELEPHONE (OUTPATIENT)
Dept: PODIATRY | Age: 47
End: 2022-11-21

## 2022-11-21 DIAGNOSIS — M79.674 CHRONIC PAIN OF TOES OF BOTH FEET: ICD-10-CM

## 2022-11-21 DIAGNOSIS — M72.2 PLANTAR FASCIITIS, BILATERAL: Primary | ICD-10-CM

## 2022-11-21 DIAGNOSIS — M25.571 SINUS TARSI SYNDROME OF RIGHT ANKLE: ICD-10-CM

## 2022-11-21 DIAGNOSIS — M77.50 ENTHESOPATHY OF ANKLE AND TARSUS: ICD-10-CM

## 2022-11-21 DIAGNOSIS — M79.675 CHRONIC PAIN OF TOES OF BOTH FEET: ICD-10-CM

## 2022-11-21 DIAGNOSIS — G89.29 CHRONIC PAIN OF TOES OF BOTH FEET: ICD-10-CM

## 2022-11-21 DIAGNOSIS — M72.2 PLANTAR FASCIAL FIBROMATOSIS OF LEFT FOOT: ICD-10-CM

## 2022-11-21 DIAGNOSIS — M25.572 SINUS TARSI SYNDROME OF LEFT ANKLE: ICD-10-CM

## 2022-11-22 RX ORDER — TRAMADOL HYDROCHLORIDE 50 MG/1
50 TABLET ORAL EVERY 8 HOURS PRN
Qty: 84 TABLET | Refills: 0 | Status: SHIPPED | OUTPATIENT
Start: 2022-11-22 | End: 2022-12-22

## 2022-11-23 ENCOUNTER — TELEPHONE (OUTPATIENT)
Dept: PODIATRY | Age: 47
End: 2022-11-23

## 2022-11-23 NOTE — TELEPHONE ENCOUNTER
Pt wife called and stated his pain medication was written for every 8 hrs, when it is normally written every six hours. Please contact pt wife.

## 2022-12-13 DIAGNOSIS — M72.2 PLANTAR FASCIITIS, BILATERAL: ICD-10-CM

## 2022-12-13 DIAGNOSIS — M72.2 PLANTAR FASCIAL FIBROMATOSIS OF LEFT FOOT: ICD-10-CM

## 2022-12-13 DIAGNOSIS — M25.572 SINUS TARSI SYNDROME OF LEFT ANKLE: ICD-10-CM

## 2022-12-13 DIAGNOSIS — M79.675 CHRONIC PAIN OF TOES OF BOTH FEET: ICD-10-CM

## 2022-12-13 DIAGNOSIS — M79.674 CHRONIC PAIN OF TOES OF BOTH FEET: ICD-10-CM

## 2022-12-13 DIAGNOSIS — M25.571 SINUS TARSI SYNDROME OF RIGHT ANKLE: ICD-10-CM

## 2022-12-13 DIAGNOSIS — G89.29 CHRONIC PAIN OF TOES OF BOTH FEET: ICD-10-CM

## 2022-12-13 DIAGNOSIS — M77.50 ENTHESOPATHY OF ANKLE AND TARSUS: ICD-10-CM

## 2022-12-13 RX ORDER — TRAMADOL HYDROCHLORIDE 50 MG/1
50 TABLET ORAL EVERY 6 HOURS PRN
Qty: 63 TABLET | Refills: 0 | Status: SHIPPED | OUTPATIENT
Start: 2022-12-13 | End: 2022-12-14

## 2022-12-14 ENCOUNTER — TELEPHONE (OUTPATIENT)
Dept: PODIATRY | Age: 47
End: 2022-12-14

## 2022-12-14 DIAGNOSIS — M72.2 PLANTAR FASCIITIS, BILATERAL: ICD-10-CM

## 2022-12-14 DIAGNOSIS — M25.571 SINUS TARSI SYNDROME OF RIGHT ANKLE: ICD-10-CM

## 2022-12-14 DIAGNOSIS — M79.675 CHRONIC PAIN OF TOES OF BOTH FEET: ICD-10-CM

## 2022-12-14 DIAGNOSIS — G89.29 CHRONIC PAIN OF TOES OF BOTH FEET: ICD-10-CM

## 2022-12-14 DIAGNOSIS — M72.2 PLANTAR FASCIAL FIBROMATOSIS OF LEFT FOOT: ICD-10-CM

## 2022-12-14 DIAGNOSIS — M77.50 ENTHESOPATHY OF ANKLE AND TARSUS: ICD-10-CM

## 2022-12-14 DIAGNOSIS — M79.674 CHRONIC PAIN OF TOES OF BOTH FEET: ICD-10-CM

## 2022-12-14 DIAGNOSIS — M25.572 SINUS TARSI SYNDROME OF LEFT ANKLE: ICD-10-CM

## 2022-12-14 RX ORDER — TRAMADOL HYDROCHLORIDE 50 MG/1
50 TABLET ORAL EVERY 6 HOURS PRN
Qty: 84 TABLET | Refills: 0 | Status: SHIPPED | OUTPATIENT
Start: 2022-12-14 | End: 2023-01-13

## 2022-12-14 NOTE — TELEPHONE ENCOUNTER
Patient's girlfriend called stating the prescription written yesterday was the wrong amount of tabs. Previous prescriptions were written for 84 tabs.  Please advise

## 2023-01-11 ENCOUNTER — TELEPHONE (OUTPATIENT)
Dept: PODIATRY | Age: 48
End: 2023-01-11

## 2023-01-11 DIAGNOSIS — M72.2 PLANTAR FASCIAL FIBROMATOSIS OF LEFT FOOT: ICD-10-CM

## 2023-01-11 DIAGNOSIS — G89.29 CHRONIC PAIN OF TOES OF BOTH FEET: ICD-10-CM

## 2023-01-11 DIAGNOSIS — M25.572 SINUS TARSI SYNDROME OF LEFT ANKLE: ICD-10-CM

## 2023-01-11 DIAGNOSIS — M72.2 PLANTAR FASCIITIS, BILATERAL: ICD-10-CM

## 2023-01-11 DIAGNOSIS — M79.675 CHRONIC PAIN OF TOES OF BOTH FEET: ICD-10-CM

## 2023-01-11 DIAGNOSIS — M79.674 CHRONIC PAIN OF TOES OF BOTH FEET: ICD-10-CM

## 2023-01-11 DIAGNOSIS — M25.571 SINUS TARSI SYNDROME OF RIGHT ANKLE: ICD-10-CM

## 2023-01-11 DIAGNOSIS — M77.50 ENTHESOPATHY OF ANKLE AND TARSUS: ICD-10-CM

## 2023-01-11 NOTE — TELEPHONE ENCOUNTER
Patients girlfriend called for refill on pain medication for patient.  Please send to [facility] in [place]

## 2023-01-12 RX ORDER — TRAMADOL HYDROCHLORIDE 50 MG/1
50 TABLET ORAL EVERY 6 HOURS PRN
Qty: 84 TABLET | Refills: 0 | Status: SHIPPED | OUTPATIENT
Start: 2023-01-12 | End: 2023-02-11

## 2023-01-31 DIAGNOSIS — G89.29 CHRONIC PAIN OF TOES OF BOTH FEET: ICD-10-CM

## 2023-01-31 DIAGNOSIS — M79.674 CHRONIC PAIN OF TOES OF BOTH FEET: ICD-10-CM

## 2023-01-31 DIAGNOSIS — M25.571 SINUS TARSI SYNDROME OF RIGHT ANKLE: ICD-10-CM

## 2023-01-31 DIAGNOSIS — M79.675 CHRONIC PAIN OF TOES OF BOTH FEET: ICD-10-CM

## 2023-01-31 DIAGNOSIS — M72.2 PLANTAR FASCIITIS, BILATERAL: ICD-10-CM

## 2023-01-31 DIAGNOSIS — M25.572 SINUS TARSI SYNDROME OF LEFT ANKLE: ICD-10-CM

## 2023-01-31 DIAGNOSIS — M72.2 PLANTAR FASCIAL FIBROMATOSIS OF LEFT FOOT: ICD-10-CM

## 2023-01-31 DIAGNOSIS — M77.50 ENTHESOPATHY OF ANKLE AND TARSUS: ICD-10-CM

## 2023-01-31 RX ORDER — TRAMADOL HYDROCHLORIDE 50 MG/1
50 TABLET ORAL EVERY 6 HOURS PRN
Qty: 84 TABLET | Refills: 0 | Status: SHIPPED | OUTPATIENT
Start: 2023-01-31 | End: 2023-03-02

## 2023-01-31 NOTE — TELEPHONE ENCOUNTER
Marissa Umaña is requesting a refill on the following medications:   Requested Prescriptions     Pending Prescriptions Disp Refills    traMADol (ULTRAM) 50 MG tablet [Pharmacy Med Name: TRAMADOL HCL 50 MG TABLET] 84 tablet      Sig: take 1 tablet by mouth every 6 hours AS NEEDED FOR PAIN for up to 30 DAYS       Last OV 1/20/22    No future appointments.     Please approve or refuse  Last filled 1/12/23

## 2023-02-21 DIAGNOSIS — M77.50 ENTHESOPATHY OF ANKLE AND TARSUS: ICD-10-CM

## 2023-02-21 DIAGNOSIS — M72.2 PLANTAR FASCIITIS, BILATERAL: ICD-10-CM

## 2023-02-21 DIAGNOSIS — M25.572 SINUS TARSI SYNDROME OF LEFT ANKLE: ICD-10-CM

## 2023-02-21 DIAGNOSIS — M25.571 SINUS TARSI SYNDROME OF RIGHT ANKLE: ICD-10-CM

## 2023-02-21 DIAGNOSIS — M79.675 CHRONIC PAIN OF TOES OF BOTH FEET: ICD-10-CM

## 2023-02-21 DIAGNOSIS — G89.29 CHRONIC PAIN OF TOES OF BOTH FEET: ICD-10-CM

## 2023-02-21 DIAGNOSIS — M79.674 CHRONIC PAIN OF TOES OF BOTH FEET: ICD-10-CM

## 2023-02-21 DIAGNOSIS — M72.2 PLANTAR FASCIAL FIBROMATOSIS OF LEFT FOOT: ICD-10-CM

## 2023-02-22 RX ORDER — TRAMADOL HYDROCHLORIDE 50 MG/1
TABLET ORAL
Qty: 84 TABLET | Refills: 0 | Status: SHIPPED | OUTPATIENT
Start: 2023-02-22 | End: 2023-03-24

## 2023-03-13 DIAGNOSIS — M79.674 CHRONIC PAIN OF TOES OF BOTH FEET: ICD-10-CM

## 2023-03-13 DIAGNOSIS — M72.2 PLANTAR FASCIITIS, BILATERAL: ICD-10-CM

## 2023-03-13 DIAGNOSIS — M25.572 SINUS TARSI SYNDROME OF LEFT ANKLE: ICD-10-CM

## 2023-03-13 DIAGNOSIS — M77.50 ENTHESOPATHY OF ANKLE AND TARSUS: ICD-10-CM

## 2023-03-13 DIAGNOSIS — M25.571 SINUS TARSI SYNDROME OF RIGHT ANKLE: ICD-10-CM

## 2023-03-13 DIAGNOSIS — M72.2 PLANTAR FASCIAL FIBROMATOSIS OF LEFT FOOT: ICD-10-CM

## 2023-03-13 DIAGNOSIS — M79.675 CHRONIC PAIN OF TOES OF BOTH FEET: ICD-10-CM

## 2023-03-13 DIAGNOSIS — G89.29 CHRONIC PAIN OF TOES OF BOTH FEET: ICD-10-CM

## 2023-03-13 RX ORDER — TRAMADOL HYDROCHLORIDE 50 MG/1
TABLET ORAL
Qty: 84 TABLET | Refills: 0 | Status: SHIPPED | OUTPATIENT
Start: 2023-03-13 | End: 2023-04-12

## 2023-05-02 DIAGNOSIS — M25.572 SINUS TARSI SYNDROME OF LEFT ANKLE: ICD-10-CM

## 2023-05-02 DIAGNOSIS — M72.2 PLANTAR FASCIITIS, BILATERAL: ICD-10-CM

## 2023-05-02 DIAGNOSIS — M79.674 CHRONIC PAIN OF TOES OF BOTH FEET: ICD-10-CM

## 2023-05-02 DIAGNOSIS — G89.29 CHRONIC PAIN OF TOES OF BOTH FEET: ICD-10-CM

## 2023-05-02 DIAGNOSIS — M72.2 PLANTAR FASCIAL FIBROMATOSIS OF LEFT FOOT: ICD-10-CM

## 2023-05-02 DIAGNOSIS — M79.675 CHRONIC PAIN OF TOES OF BOTH FEET: ICD-10-CM

## 2023-05-02 DIAGNOSIS — M25.571 SINUS TARSI SYNDROME OF RIGHT ANKLE: ICD-10-CM

## 2023-05-02 DIAGNOSIS — M77.50 ENTHESOPATHY OF ANKLE AND TARSUS: ICD-10-CM

## 2023-05-03 RX ORDER — TRAMADOL HYDROCHLORIDE 50 MG/1
TABLET ORAL
Qty: 84 TABLET | Refills: 0 | Status: SHIPPED | OUTPATIENT
Start: 2023-05-03 | End: 2023-06-02

## 2023-05-22 DIAGNOSIS — M79.674 CHRONIC PAIN OF TOES OF BOTH FEET: ICD-10-CM

## 2023-05-22 DIAGNOSIS — M25.571 SINUS TARSI SYNDROME OF RIGHT ANKLE: ICD-10-CM

## 2023-05-22 DIAGNOSIS — M72.2 PLANTAR FASCIAL FIBROMATOSIS OF LEFT FOOT: ICD-10-CM

## 2023-05-22 DIAGNOSIS — M25.572 SINUS TARSI SYNDROME OF LEFT ANKLE: ICD-10-CM

## 2023-05-22 DIAGNOSIS — M77.50 ENTHESOPATHY OF ANKLE AND TARSUS: ICD-10-CM

## 2023-05-22 DIAGNOSIS — M79.675 CHRONIC PAIN OF TOES OF BOTH FEET: ICD-10-CM

## 2023-05-22 DIAGNOSIS — M72.2 PLANTAR FASCIITIS, BILATERAL: ICD-10-CM

## 2023-05-22 DIAGNOSIS — G89.29 CHRONIC PAIN OF TOES OF BOTH FEET: ICD-10-CM

## 2023-05-23 RX ORDER — TRAMADOL HYDROCHLORIDE 50 MG/1
TABLET ORAL
Qty: 84 TABLET | Refills: 0 | Status: SHIPPED | OUTPATIENT
Start: 2023-05-23 | End: 2023-06-22

## 2023-06-12 DIAGNOSIS — M79.675 CHRONIC PAIN OF TOES OF BOTH FEET: ICD-10-CM

## 2023-06-12 DIAGNOSIS — M72.2 PLANTAR FASCIITIS, BILATERAL: ICD-10-CM

## 2023-06-12 DIAGNOSIS — M25.571 SINUS TARSI SYNDROME OF RIGHT ANKLE: ICD-10-CM

## 2023-06-12 DIAGNOSIS — M72.2 PLANTAR FASCIAL FIBROMATOSIS OF LEFT FOOT: ICD-10-CM

## 2023-06-12 DIAGNOSIS — M77.50 ENTHESOPATHY OF ANKLE AND TARSUS: ICD-10-CM

## 2023-06-12 DIAGNOSIS — M79.674 CHRONIC PAIN OF TOES OF BOTH FEET: ICD-10-CM

## 2023-06-12 DIAGNOSIS — M25.572 SINUS TARSI SYNDROME OF LEFT ANKLE: ICD-10-CM

## 2023-06-12 DIAGNOSIS — G89.29 CHRONIC PAIN OF TOES OF BOTH FEET: ICD-10-CM

## 2023-06-19 RX ORDER — TRAMADOL HYDROCHLORIDE 50 MG/1
TABLET ORAL
Qty: 84 TABLET | Refills: 0 | Status: SHIPPED | OUTPATIENT
Start: 2023-06-19 | End: 2023-07-19

## 2023-07-10 DIAGNOSIS — M25.571 SINUS TARSI SYNDROME OF RIGHT ANKLE: ICD-10-CM

## 2023-07-10 DIAGNOSIS — M77.50 ENTHESOPATHY OF ANKLE AND TARSUS: ICD-10-CM

## 2023-07-10 DIAGNOSIS — M79.674 CHRONIC PAIN OF TOES OF BOTH FEET: ICD-10-CM

## 2023-07-10 DIAGNOSIS — M72.2 PLANTAR FASCIITIS, BILATERAL: ICD-10-CM

## 2023-07-10 DIAGNOSIS — M72.2 PLANTAR FASCIAL FIBROMATOSIS OF LEFT FOOT: ICD-10-CM

## 2023-07-10 DIAGNOSIS — G89.29 CHRONIC PAIN OF TOES OF BOTH FEET: ICD-10-CM

## 2023-07-10 DIAGNOSIS — M25.572 SINUS TARSI SYNDROME OF LEFT ANKLE: ICD-10-CM

## 2023-07-10 DIAGNOSIS — M79.675 CHRONIC PAIN OF TOES OF BOTH FEET: ICD-10-CM

## 2023-07-10 RX ORDER — TRAMADOL HYDROCHLORIDE 50 MG/1
TABLET ORAL
Qty: 84 TABLET | Refills: 0 | Status: SHIPPED | OUTPATIENT
Start: 2023-07-10 | End: 2023-08-09

## 2023-07-10 NOTE — TELEPHONE ENCOUNTER
Chuck Chapman is requesting a refill on the following medications:   Requested Prescriptions     Pending Prescriptions Disp Refills    traMADol (ULTRAM) 50 MG tablet [Pharmacy Med Name: TRAMADOL HCL 50 MG TABLET] 84 tablet      Sig: take 1 tablet by mouth every 6 hours AS NEEDED FOR PAIN       Last OV 1/20/2022    No future appointments. Please approve or refuse.

## 2023-07-31 DIAGNOSIS — M77.50 ENTHESOPATHY OF ANKLE AND TARSUS: ICD-10-CM

## 2023-07-31 DIAGNOSIS — M72.2 PLANTAR FASCIAL FIBROMATOSIS OF LEFT FOOT: ICD-10-CM

## 2023-07-31 DIAGNOSIS — G89.29 CHRONIC PAIN OF TOES OF BOTH FEET: ICD-10-CM

## 2023-07-31 DIAGNOSIS — M25.572 SINUS TARSI SYNDROME OF LEFT ANKLE: ICD-10-CM

## 2023-07-31 DIAGNOSIS — M72.2 PLANTAR FASCIITIS, BILATERAL: ICD-10-CM

## 2023-07-31 DIAGNOSIS — M25.571 SINUS TARSI SYNDROME OF RIGHT ANKLE: ICD-10-CM

## 2023-07-31 DIAGNOSIS — M79.675 CHRONIC PAIN OF TOES OF BOTH FEET: ICD-10-CM

## 2023-07-31 DIAGNOSIS — M79.674 CHRONIC PAIN OF TOES OF BOTH FEET: ICD-10-CM

## 2023-08-02 RX ORDER — TRAMADOL HYDROCHLORIDE 50 MG/1
TABLET ORAL
Qty: 84 TABLET | Refills: 0 | Status: SHIPPED | OUTPATIENT
Start: 2023-08-02 | End: 2023-09-01

## 2023-08-22 DIAGNOSIS — M72.2 PLANTAR FASCIAL FIBROMATOSIS OF LEFT FOOT: ICD-10-CM

## 2023-08-22 DIAGNOSIS — M79.675 CHRONIC PAIN OF TOES OF BOTH FEET: ICD-10-CM

## 2023-08-22 DIAGNOSIS — M25.571 SINUS TARSI SYNDROME OF RIGHT ANKLE: ICD-10-CM

## 2023-08-22 DIAGNOSIS — G89.29 CHRONIC PAIN OF TOES OF BOTH FEET: ICD-10-CM

## 2023-08-22 DIAGNOSIS — M25.572 SINUS TARSI SYNDROME OF LEFT ANKLE: ICD-10-CM

## 2023-08-22 DIAGNOSIS — M79.674 CHRONIC PAIN OF TOES OF BOTH FEET: ICD-10-CM

## 2023-08-22 DIAGNOSIS — M77.50 ENTHESOPATHY OF ANKLE AND TARSUS: ICD-10-CM

## 2023-08-22 DIAGNOSIS — M72.2 PLANTAR FASCIITIS, BILATERAL: ICD-10-CM

## 2023-08-24 RX ORDER — TRAMADOL HYDROCHLORIDE 50 MG/1
TABLET ORAL
Qty: 84 TABLET | Refills: 0 | Status: SHIPPED | OUTPATIENT
Start: 2023-08-24 | End: 2023-09-23

## 2023-09-12 DIAGNOSIS — M77.50 ENTHESOPATHY OF ANKLE AND TARSUS: ICD-10-CM

## 2023-09-12 DIAGNOSIS — G89.29 CHRONIC PAIN OF TOES OF BOTH FEET: ICD-10-CM

## 2023-09-12 DIAGNOSIS — M79.675 CHRONIC PAIN OF TOES OF BOTH FEET: ICD-10-CM

## 2023-09-12 DIAGNOSIS — M25.572 SINUS TARSI SYNDROME OF LEFT ANKLE: ICD-10-CM

## 2023-09-12 DIAGNOSIS — M79.674 CHRONIC PAIN OF TOES OF BOTH FEET: ICD-10-CM

## 2023-09-12 DIAGNOSIS — M72.2 PLANTAR FASCIAL FIBROMATOSIS OF LEFT FOOT: ICD-10-CM

## 2023-09-12 DIAGNOSIS — M25.571 SINUS TARSI SYNDROME OF RIGHT ANKLE: ICD-10-CM

## 2023-09-12 DIAGNOSIS — M72.2 PLANTAR FASCIITIS, BILATERAL: ICD-10-CM

## 2023-09-12 NOTE — TELEPHONE ENCOUNTER
Please Approve or Refuse.        Next Visit Date:  Visit date not found   Last Visit Date: 1/20/2022    No results found for: \"LABA1C\"          ( goal A1C is < 7)   BP Readings from Last 3 Encounters:   02/01/18 (!) 180/126   01/09/17 (!) 142/93   10/03/16 (!) 139/96          (goal 120/80)  BUN   Date Value Ref Range Status   01/30/2015 14 6 - 20 mg/dL Final     Creatinine   Date Value Ref Range Status   01/30/2015 0.71 0.70 - 1.20 mg/dL Final     Potassium   Date Value Ref Range Status   01/30/2015 3.7 3.7 - 5.3 mmol/L Final

## 2023-09-13 RX ORDER — TRAMADOL HYDROCHLORIDE 50 MG/1
TABLET ORAL
Qty: 84 TABLET | Refills: 0 | Status: SHIPPED | OUTPATIENT
Start: 2023-09-13 | End: 2023-10-13

## 2023-10-02 DIAGNOSIS — M25.572 SINUS TARSI SYNDROME OF LEFT ANKLE: ICD-10-CM

## 2023-10-02 DIAGNOSIS — M77.50 ENTHESOPATHY OF ANKLE AND TARSUS: ICD-10-CM

## 2023-10-02 DIAGNOSIS — G89.29 CHRONIC PAIN OF TOES OF BOTH FEET: ICD-10-CM

## 2023-10-02 DIAGNOSIS — M72.2 PLANTAR FASCIAL FIBROMATOSIS OF LEFT FOOT: ICD-10-CM

## 2023-10-02 DIAGNOSIS — M79.674 CHRONIC PAIN OF TOES OF BOTH FEET: ICD-10-CM

## 2023-10-02 DIAGNOSIS — M25.571 SINUS TARSI SYNDROME OF RIGHT ANKLE: ICD-10-CM

## 2023-10-02 DIAGNOSIS — M72.2 PLANTAR FASCIITIS, BILATERAL: ICD-10-CM

## 2023-10-02 DIAGNOSIS — M79.675 CHRONIC PAIN OF TOES OF BOTH FEET: ICD-10-CM

## 2023-10-03 NOTE — TELEPHONE ENCOUNTER
Please Approve or Refuse.   Send to Pharmacy per Pt's Request:      Next Visit Date:  Visit date not found   Last Visit Date: 1/20/2022    No results found for: \"LABA1C\"          ( goal A1C is < 7)   BP Readings from Last 3 Encounters:   02/01/18 (!) 180/126   01/09/17 (!) 142/93   10/03/16 (!) 139/96          (goal 120/80)  BUN   Date Value Ref Range Status   01/30/2015 14 6 - 20 mg/dL Final     Creatinine   Date Value Ref Range Status   01/30/2015 0.71 0.70 - 1.20 mg/dL Final     Potassium   Date Value Ref Range Status   01/30/2015 3.7 3.7 - 5.3 mmol/L Final

## 2023-10-04 RX ORDER — TRAMADOL HYDROCHLORIDE 50 MG/1
TABLET ORAL
Qty: 84 TABLET | Refills: 0 | Status: SHIPPED | OUTPATIENT
Start: 2023-10-04 | End: 2023-11-03

## 2023-10-23 DIAGNOSIS — M25.572 SINUS TARSI SYNDROME OF LEFT ANKLE: ICD-10-CM

## 2023-10-23 DIAGNOSIS — M79.674 CHRONIC PAIN OF TOES OF BOTH FEET: ICD-10-CM

## 2023-10-23 DIAGNOSIS — M72.2 PLANTAR FASCIAL FIBROMATOSIS OF LEFT FOOT: ICD-10-CM

## 2023-10-23 DIAGNOSIS — M77.50 ENTHESOPATHY OF ANKLE AND TARSUS: ICD-10-CM

## 2023-10-23 DIAGNOSIS — M79.675 CHRONIC PAIN OF TOES OF BOTH FEET: ICD-10-CM

## 2023-10-23 DIAGNOSIS — M72.2 PLANTAR FASCIITIS, BILATERAL: ICD-10-CM

## 2023-10-23 DIAGNOSIS — M25.571 SINUS TARSI SYNDROME OF RIGHT ANKLE: ICD-10-CM

## 2023-10-23 DIAGNOSIS — G89.29 CHRONIC PAIN OF TOES OF BOTH FEET: ICD-10-CM

## 2023-10-25 RX ORDER — TRAMADOL HYDROCHLORIDE 50 MG/1
50 TABLET ORAL EVERY 6 HOURS PRN
Qty: 84 TABLET | Refills: 0 | Status: SHIPPED | OUTPATIENT
Start: 2023-10-25 | End: 2023-11-24

## 2023-11-13 DIAGNOSIS — M79.674 CHRONIC PAIN OF TOES OF BOTH FEET: ICD-10-CM

## 2023-11-13 DIAGNOSIS — M72.2 PLANTAR FASCIITIS, BILATERAL: ICD-10-CM

## 2023-11-13 DIAGNOSIS — M25.572 SINUS TARSI SYNDROME OF LEFT ANKLE: ICD-10-CM

## 2023-11-13 DIAGNOSIS — M77.50 ENTHESOPATHY OF ANKLE AND TARSUS: ICD-10-CM

## 2023-11-13 DIAGNOSIS — M25.571 SINUS TARSI SYNDROME OF RIGHT ANKLE: ICD-10-CM

## 2023-11-13 DIAGNOSIS — M79.675 CHRONIC PAIN OF TOES OF BOTH FEET: ICD-10-CM

## 2023-11-13 DIAGNOSIS — G89.29 CHRONIC PAIN OF TOES OF BOTH FEET: ICD-10-CM

## 2023-11-13 DIAGNOSIS — M72.2 PLANTAR FASCIAL FIBROMATOSIS OF LEFT FOOT: ICD-10-CM

## 2023-11-14 RX ORDER — TRAMADOL HYDROCHLORIDE 50 MG/1
50 TABLET ORAL EVERY 6 HOURS PRN
Qty: 84 TABLET | Refills: 0 | Status: SHIPPED | OUTPATIENT
Start: 2023-11-14 | End: 2023-12-14

## 2023-12-04 DIAGNOSIS — M25.572 SINUS TARSI SYNDROME OF LEFT ANKLE: ICD-10-CM

## 2023-12-04 DIAGNOSIS — M72.2 PLANTAR FASCIITIS, BILATERAL: ICD-10-CM

## 2023-12-04 DIAGNOSIS — M72.2 PLANTAR FASCIAL FIBROMATOSIS OF LEFT FOOT: ICD-10-CM

## 2023-12-04 DIAGNOSIS — M77.50 ENTHESOPATHY OF ANKLE AND TARSUS: ICD-10-CM

## 2023-12-04 DIAGNOSIS — M79.674 CHRONIC PAIN OF TOES OF BOTH FEET: ICD-10-CM

## 2023-12-04 DIAGNOSIS — M25.571 SINUS TARSI SYNDROME OF RIGHT ANKLE: ICD-10-CM

## 2023-12-04 DIAGNOSIS — M79.675 CHRONIC PAIN OF TOES OF BOTH FEET: ICD-10-CM

## 2023-12-04 DIAGNOSIS — G89.29 CHRONIC PAIN OF TOES OF BOTH FEET: ICD-10-CM

## 2023-12-05 RX ORDER — TRAMADOL HYDROCHLORIDE 50 MG/1
50 TABLET ORAL EVERY 6 HOURS PRN
Qty: 84 TABLET | Refills: 0 | Status: SHIPPED | OUTPATIENT
Start: 2023-12-05 | End: 2023-12-28

## 2023-12-20 DIAGNOSIS — M77.50 ENTHESOPATHY OF ANKLE AND TARSUS: ICD-10-CM

## 2023-12-20 DIAGNOSIS — G89.29 CHRONIC PAIN OF TOES OF BOTH FEET: ICD-10-CM

## 2023-12-20 DIAGNOSIS — M25.572 SINUS TARSI SYNDROME OF LEFT ANKLE: ICD-10-CM

## 2023-12-20 DIAGNOSIS — M79.675 CHRONIC PAIN OF TOES OF BOTH FEET: ICD-10-CM

## 2023-12-20 DIAGNOSIS — M25.571 SINUS TARSI SYNDROME OF RIGHT ANKLE: ICD-10-CM

## 2023-12-20 DIAGNOSIS — M72.2 PLANTAR FASCIITIS, BILATERAL: ICD-10-CM

## 2023-12-20 DIAGNOSIS — M72.2 PLANTAR FASCIAL FIBROMATOSIS OF LEFT FOOT: ICD-10-CM

## 2023-12-20 DIAGNOSIS — M79.674 CHRONIC PAIN OF TOES OF BOTH FEET: ICD-10-CM

## 2023-12-20 RX ORDER — TRAMADOL HYDROCHLORIDE 50 MG/1
50 TABLET ORAL EVERY 6 HOURS PRN
Qty: 84 TABLET | OUTPATIENT
Start: 2023-12-20 | End: 2024-01-19

## 2023-12-26 DIAGNOSIS — M25.572 SINUS TARSI SYNDROME OF LEFT ANKLE: ICD-10-CM

## 2023-12-26 DIAGNOSIS — M77.50 ENTHESOPATHY OF ANKLE AND TARSUS: ICD-10-CM

## 2023-12-26 DIAGNOSIS — M79.674 CHRONIC PAIN OF TOES OF BOTH FEET: ICD-10-CM

## 2023-12-26 DIAGNOSIS — G89.29 CHRONIC PAIN OF TOES OF BOTH FEET: ICD-10-CM

## 2023-12-26 DIAGNOSIS — M25.571 SINUS TARSI SYNDROME OF RIGHT ANKLE: ICD-10-CM

## 2023-12-26 DIAGNOSIS — M72.2 PLANTAR FASCIITIS, BILATERAL: ICD-10-CM

## 2023-12-26 DIAGNOSIS — M79.675 CHRONIC PAIN OF TOES OF BOTH FEET: ICD-10-CM

## 2023-12-26 DIAGNOSIS — M72.2 PLANTAR FASCIAL FIBROMATOSIS OF LEFT FOOT: ICD-10-CM

## 2023-12-28 RX ORDER — TRAMADOL HYDROCHLORIDE 50 MG/1
50 TABLET ORAL EVERY 6 HOURS PRN
Qty: 84 TABLET | Refills: 0 | Status: SHIPPED | OUTPATIENT
Start: 2023-12-28 | End: 2024-01-27

## 2024-01-17 DIAGNOSIS — M79.675 CHRONIC PAIN OF TOES OF BOTH FEET: ICD-10-CM

## 2024-01-17 DIAGNOSIS — M72.2 PLANTAR FASCIAL FIBROMATOSIS OF LEFT FOOT: ICD-10-CM

## 2024-01-17 DIAGNOSIS — M72.2 PLANTAR FASCIITIS, BILATERAL: ICD-10-CM

## 2024-01-17 DIAGNOSIS — M79.674 CHRONIC PAIN OF TOES OF BOTH FEET: ICD-10-CM

## 2024-01-17 DIAGNOSIS — M25.571 SINUS TARSI SYNDROME OF RIGHT ANKLE: ICD-10-CM

## 2024-01-17 DIAGNOSIS — M25.572 SINUS TARSI SYNDROME OF LEFT ANKLE: ICD-10-CM

## 2024-01-17 DIAGNOSIS — G89.29 CHRONIC PAIN OF TOES OF BOTH FEET: ICD-10-CM

## 2024-01-17 DIAGNOSIS — M77.50 ENTHESOPATHY OF ANKLE AND TARSUS: ICD-10-CM

## 2024-01-17 RX ORDER — TRAMADOL HYDROCHLORIDE 50 MG/1
50 TABLET ORAL EVERY 6 HOURS PRN
Qty: 84 TABLET | Refills: 0 | Status: SHIPPED | OUTPATIENT
Start: 2024-01-17 | End: 2024-02-16

## 2024-02-07 DIAGNOSIS — G89.29 CHRONIC PAIN OF TOES OF BOTH FEET: ICD-10-CM

## 2024-02-07 DIAGNOSIS — M25.572 SINUS TARSI SYNDROME OF LEFT ANKLE: ICD-10-CM

## 2024-02-07 DIAGNOSIS — M79.674 CHRONIC PAIN OF TOES OF BOTH FEET: ICD-10-CM

## 2024-02-07 DIAGNOSIS — M77.50 ENTHESOPATHY OF ANKLE AND TARSUS: ICD-10-CM

## 2024-02-07 DIAGNOSIS — M25.571 SINUS TARSI SYNDROME OF RIGHT ANKLE: ICD-10-CM

## 2024-02-07 DIAGNOSIS — M72.2 PLANTAR FASCIAL FIBROMATOSIS OF LEFT FOOT: ICD-10-CM

## 2024-02-07 DIAGNOSIS — M79.675 CHRONIC PAIN OF TOES OF BOTH FEET: ICD-10-CM

## 2024-02-07 DIAGNOSIS — M72.2 PLANTAR FASCIITIS, BILATERAL: ICD-10-CM

## 2024-02-08 RX ORDER — TRAMADOL HYDROCHLORIDE 50 MG/1
50 TABLET ORAL EVERY 8 HOURS PRN
Qty: 84 TABLET | Refills: 0 | Status: SHIPPED | OUTPATIENT
Start: 2024-02-08 | End: 2024-03-09

## 2024-03-11 DIAGNOSIS — M25.572 SINUS TARSI SYNDROME OF LEFT ANKLE: ICD-10-CM

## 2024-03-11 DIAGNOSIS — G89.29 CHRONIC PAIN OF TOES OF BOTH FEET: ICD-10-CM

## 2024-03-11 DIAGNOSIS — M79.674 CHRONIC PAIN OF TOES OF BOTH FEET: ICD-10-CM

## 2024-03-11 DIAGNOSIS — M79.675 CHRONIC PAIN OF TOES OF BOTH FEET: ICD-10-CM

## 2024-03-11 DIAGNOSIS — M72.2 PLANTAR FASCIAL FIBROMATOSIS OF LEFT FOOT: ICD-10-CM

## 2024-03-11 DIAGNOSIS — M77.50 ENTHESOPATHY OF ANKLE AND TARSUS: ICD-10-CM

## 2024-03-11 DIAGNOSIS — M72.2 PLANTAR FASCIITIS, BILATERAL: ICD-10-CM

## 2024-03-11 DIAGNOSIS — M25.571 SINUS TARSI SYNDROME OF RIGHT ANKLE: ICD-10-CM

## 2024-03-11 NOTE — TELEPHONE ENCOUNTER
Patient's girlfriend called requesting a refill of tramadol. She explained that last month it was called in incorrectly. It was written for 1 tab every 8 hrs instead of 1 every 6 hrs. Please advise if this was a purposeful change. Also pended medication if appropriate

## 2024-03-12 RX ORDER — TRAMADOL HYDROCHLORIDE 50 MG/1
50 TABLET ORAL EVERY 6 HOURS PRN
Qty: 84 TABLET | Refills: 0 | Status: SHIPPED | OUTPATIENT
Start: 2024-03-12 | End: 2024-04-11

## 2024-04-01 DIAGNOSIS — M72.2 PLANTAR FASCIAL FIBROMATOSIS OF LEFT FOOT: ICD-10-CM

## 2024-04-01 DIAGNOSIS — M72.2 PLANTAR FASCIITIS, BILATERAL: ICD-10-CM

## 2024-04-01 DIAGNOSIS — M25.572 SINUS TARSI SYNDROME OF LEFT ANKLE: ICD-10-CM

## 2024-04-01 DIAGNOSIS — M79.675 CHRONIC PAIN OF TOES OF BOTH FEET: ICD-10-CM

## 2024-04-01 DIAGNOSIS — G89.29 CHRONIC PAIN OF TOES OF BOTH FEET: ICD-10-CM

## 2024-04-01 DIAGNOSIS — M77.50 ENTHESOPATHY OF ANKLE AND TARSUS: ICD-10-CM

## 2024-04-01 DIAGNOSIS — M25.571 SINUS TARSI SYNDROME OF RIGHT ANKLE: ICD-10-CM

## 2024-04-01 DIAGNOSIS — M79.674 CHRONIC PAIN OF TOES OF BOTH FEET: ICD-10-CM

## 2024-04-02 RX ORDER — TRAMADOL HYDROCHLORIDE 50 MG/1
50 TABLET ORAL EVERY 6 HOURS PRN
Qty: 84 TABLET | Refills: 0 | Status: SHIPPED | OUTPATIENT
Start: 2024-04-02 | End: 2024-05-02

## 2024-04-22 DIAGNOSIS — M25.571 SINUS TARSI SYNDROME OF RIGHT ANKLE: ICD-10-CM

## 2024-04-22 DIAGNOSIS — M79.675 CHRONIC PAIN OF TOES OF BOTH FEET: ICD-10-CM

## 2024-04-22 DIAGNOSIS — M77.50 ENTHESOPATHY OF ANKLE AND TARSUS: ICD-10-CM

## 2024-04-22 DIAGNOSIS — G89.29 CHRONIC PAIN OF TOES OF BOTH FEET: ICD-10-CM

## 2024-04-22 DIAGNOSIS — M79.674 CHRONIC PAIN OF TOES OF BOTH FEET: ICD-10-CM

## 2024-04-22 DIAGNOSIS — M72.2 PLANTAR FASCIAL FIBROMATOSIS OF LEFT FOOT: ICD-10-CM

## 2024-04-22 DIAGNOSIS — M25.572 SINUS TARSI SYNDROME OF LEFT ANKLE: ICD-10-CM

## 2024-04-22 DIAGNOSIS — M72.2 PLANTAR FASCIITIS, BILATERAL: ICD-10-CM

## 2024-04-22 NOTE — TELEPHONE ENCOUNTER
Carson Cisneros is requesting a refill on the following medications:   Requested Prescriptions     Pending Prescriptions Disp Refills    traMADol (ULTRAM) 50 MG tablet [Pharmacy Med Name: TRAMADOL HCL 50 MG TABLET] 84 tablet      Sig: Take 1 tablet by mouth every 6 hours as needed for Pain.       Last OV 1/20/2022    No future appointments.    Please approve or refuse

## 2024-04-24 RX ORDER — TRAMADOL HYDROCHLORIDE 50 MG/1
50 TABLET ORAL EVERY 6 HOURS PRN
Qty: 84 TABLET | Refills: 0 | Status: SHIPPED | OUTPATIENT
Start: 2024-04-24 | End: 2024-05-24

## 2024-05-13 DIAGNOSIS — M25.571 SINUS TARSI SYNDROME OF RIGHT ANKLE: ICD-10-CM

## 2024-05-13 DIAGNOSIS — M25.572 SINUS TARSI SYNDROME OF LEFT ANKLE: ICD-10-CM

## 2024-05-13 DIAGNOSIS — M77.50 ENTHESOPATHY OF ANKLE AND TARSUS: ICD-10-CM

## 2024-05-13 DIAGNOSIS — M72.2 PLANTAR FASCIAL FIBROMATOSIS OF LEFT FOOT: ICD-10-CM

## 2024-05-13 DIAGNOSIS — M79.674 CHRONIC PAIN OF TOES OF BOTH FEET: ICD-10-CM

## 2024-05-13 DIAGNOSIS — M72.2 PLANTAR FASCIITIS, BILATERAL: ICD-10-CM

## 2024-05-13 DIAGNOSIS — M79.675 CHRONIC PAIN OF TOES OF BOTH FEET: ICD-10-CM

## 2024-05-13 DIAGNOSIS — G89.29 CHRONIC PAIN OF TOES OF BOTH FEET: ICD-10-CM

## 2024-05-14 RX ORDER — TRAMADOL HYDROCHLORIDE 50 MG/1
50 TABLET ORAL EVERY 8 HOURS PRN
Qty: 84 TABLET | Refills: 0 | Status: SHIPPED | OUTPATIENT
Start: 2024-05-14 | End: 2024-06-13

## 2024-05-14 NOTE — TELEPHONE ENCOUNTER
Carson Cisneros is requesting a refill on the following medications:   Requested Prescriptions     Pending Prescriptions Disp Refills    traMADol (ULTRAM) 50 MG tablet [Pharmacy Med Name: TRAMADOL HCL 50 MG TABLET] 84 tablet      Sig: take 1 tablet by mouth every 6 hours AS NEEDED FOR PAIN maximum daily dose of 4 tablets       Last OV 1/20/2022    No future appointments.    Please approve or refuse.

## 2024-05-17 ENCOUNTER — TELEPHONE (OUTPATIENT)
Dept: PODIATRY | Age: 49
End: 2024-05-17

## 2024-05-17 DIAGNOSIS — M79.674 CHRONIC PAIN OF TOES OF BOTH FEET: ICD-10-CM

## 2024-05-17 DIAGNOSIS — M79.675 CHRONIC PAIN OF TOES OF BOTH FEET: ICD-10-CM

## 2024-05-17 DIAGNOSIS — M77.50 ENTHESOPATHY OF ANKLE AND TARSUS: ICD-10-CM

## 2024-05-17 DIAGNOSIS — M25.572 SINUS TARSI SYNDROME OF LEFT ANKLE: ICD-10-CM

## 2024-05-17 DIAGNOSIS — M72.2 PLANTAR FASCIAL FIBROMATOSIS OF LEFT FOOT: ICD-10-CM

## 2024-05-17 DIAGNOSIS — M25.571 SINUS TARSI SYNDROME OF RIGHT ANKLE: ICD-10-CM

## 2024-05-17 DIAGNOSIS — G89.29 CHRONIC PAIN OF TOES OF BOTH FEET: ICD-10-CM

## 2024-05-17 DIAGNOSIS — M72.2 PLANTAR FASCIITIS, BILATERAL: ICD-10-CM

## 2024-05-17 RX ORDER — TRAMADOL HYDROCHLORIDE 50 MG/1
50 TABLET ORAL EVERY 6 HOURS PRN
Qty: 84 TABLET | Refills: 0 | OUTPATIENT
Start: 2024-05-17 | End: 2024-06-16

## 2024-05-17 NOTE — TELEPHONE ENCOUNTER
Patients girlfriend Mera stated that script for tramadol was sent in for every 8 hours but it should be every 6 hours.

## 2024-07-09 DIAGNOSIS — M77.50 ENTHESOPATHY OF ANKLE AND TARSUS: ICD-10-CM

## 2024-07-09 DIAGNOSIS — G89.29 CHRONIC PAIN OF TOES OF BOTH FEET: ICD-10-CM

## 2024-07-09 DIAGNOSIS — M79.674 CHRONIC PAIN OF TOES OF BOTH FEET: ICD-10-CM

## 2024-07-09 DIAGNOSIS — M25.572 SINUS TARSI SYNDROME OF LEFT ANKLE: ICD-10-CM

## 2024-07-09 DIAGNOSIS — M72.2 PLANTAR FASCIAL FIBROMATOSIS OF LEFT FOOT: ICD-10-CM

## 2024-07-09 DIAGNOSIS — M25.571 SINUS TARSI SYNDROME OF RIGHT ANKLE: ICD-10-CM

## 2024-07-09 DIAGNOSIS — M72.2 PLANTAR FASCIITIS, BILATERAL: ICD-10-CM

## 2024-07-09 DIAGNOSIS — M79.675 CHRONIC PAIN OF TOES OF BOTH FEET: ICD-10-CM

## 2024-07-10 RX ORDER — TRAMADOL HYDROCHLORIDE 50 MG/1
50 TABLET ORAL EVERY 6 HOURS PRN
Qty: 84 TABLET | Refills: 0 | Status: SHIPPED | OUTPATIENT
Start: 2024-07-10 | End: 2024-08-09

## 2024-08-02 DIAGNOSIS — M79.674 CHRONIC PAIN OF TOES OF BOTH FEET: ICD-10-CM

## 2024-08-02 DIAGNOSIS — M25.572 SINUS TARSI SYNDROME OF LEFT ANKLE: ICD-10-CM

## 2024-08-02 DIAGNOSIS — M25.571 SINUS TARSI SYNDROME OF RIGHT ANKLE: ICD-10-CM

## 2024-08-02 DIAGNOSIS — G89.29 CHRONIC PAIN OF TOES OF BOTH FEET: ICD-10-CM

## 2024-08-02 DIAGNOSIS — M72.2 PLANTAR FASCIITIS, BILATERAL: ICD-10-CM

## 2024-08-02 DIAGNOSIS — M77.50 ENTHESOPATHY OF ANKLE AND TARSUS: ICD-10-CM

## 2024-08-02 DIAGNOSIS — M72.2 PLANTAR FASCIAL FIBROMATOSIS OF LEFT FOOT: ICD-10-CM

## 2024-08-02 DIAGNOSIS — M79.675 CHRONIC PAIN OF TOES OF BOTH FEET: ICD-10-CM

## 2024-08-02 NOTE — TELEPHONE ENCOUNTER
Please Approve or Refuse.  Send to Pharmacy per Pt's Request: [facility]     Next Visit Date:  Visit date not found   Last Visit Date: 1/20/2022    No results found for: \"LABA1C\"          ( goal A1C is < 7)   BP Readings from Last 3 Encounters:   02/01/18 (!) 180/126   01/09/17 (!) 142/93   10/03/16 (!) 139/96          (goal 120/80)  BUN   Date Value Ref Range Status   01/30/2015 14 6 - 20 mg/dL Final     Creatinine   Date Value Ref Range Status   01/30/2015 0.71 0.70 - 1.20 mg/dL Final     Potassium   Date Value Ref Range Status   01/30/2015 3.7 3.7 - 5.3 mmol/L Final

## 2024-08-05 RX ORDER — TRAMADOL HYDROCHLORIDE 50 MG/1
50 TABLET ORAL EVERY 6 HOURS PRN
Qty: 84 TABLET | Refills: 0 | Status: SHIPPED | OUTPATIENT
Start: 2024-08-05 | End: 2024-09-04

## 2024-09-04 DIAGNOSIS — M25.571 SINUS TARSI SYNDROME OF RIGHT ANKLE: ICD-10-CM

## 2024-09-04 DIAGNOSIS — M72.2 PLANTAR FASCIITIS, BILATERAL: ICD-10-CM

## 2024-09-04 DIAGNOSIS — M72.2 PLANTAR FASCIAL FIBROMATOSIS OF LEFT FOOT: ICD-10-CM

## 2024-09-04 DIAGNOSIS — G89.29 CHRONIC PAIN OF TOES OF BOTH FEET: ICD-10-CM

## 2024-09-04 DIAGNOSIS — M79.675 CHRONIC PAIN OF TOES OF BOTH FEET: ICD-10-CM

## 2024-09-04 DIAGNOSIS — M79.674 CHRONIC PAIN OF TOES OF BOTH FEET: ICD-10-CM

## 2024-09-04 DIAGNOSIS — M25.572 SINUS TARSI SYNDROME OF LEFT ANKLE: ICD-10-CM

## 2024-09-04 DIAGNOSIS — M77.50 ENTHESOPATHY OF ANKLE AND TARSUS: ICD-10-CM

## 2024-09-05 DIAGNOSIS — M77.50 ENTHESOPATHY OF ANKLE AND TARSUS: ICD-10-CM

## 2024-09-05 DIAGNOSIS — M25.572 SINUS TARSI SYNDROME OF LEFT ANKLE: ICD-10-CM

## 2024-09-05 DIAGNOSIS — M72.2 PLANTAR FASCIITIS, BILATERAL: Primary | ICD-10-CM

## 2024-09-05 DIAGNOSIS — M25.571 SINUS TARSI SYNDROME OF RIGHT ANKLE: ICD-10-CM

## 2024-09-05 DIAGNOSIS — M79.675 CHRONIC PAIN OF TOES OF BOTH FEET: ICD-10-CM

## 2024-09-05 DIAGNOSIS — G89.29 CHRONIC PAIN OF TOES OF BOTH FEET: ICD-10-CM

## 2024-09-05 DIAGNOSIS — M72.2 PLANTAR FASCIAL FIBROMATOSIS OF LEFT FOOT: ICD-10-CM

## 2024-09-05 DIAGNOSIS — M79.674 CHRONIC PAIN OF TOES OF BOTH FEET: ICD-10-CM

## 2024-09-05 RX ORDER — TRAMADOL HYDROCHLORIDE 50 MG/1
50 TABLET ORAL EVERY 6 HOURS PRN
Qty: 84 TABLET | OUTPATIENT
Start: 2024-09-05 | End: 2024-10-05

## 2024-09-05 RX ORDER — TRAMADOL HYDROCHLORIDE 50 MG/1
50 TABLET ORAL EVERY 8 HOURS PRN
Qty: 84 TABLET | Refills: 0 | Status: SHIPPED | OUTPATIENT
Start: 2024-09-05 | End: 2024-10-05

## 2024-09-05 NOTE — TELEPHONE ENCOUNTER
Please Approve or Refuse.       Next Visit Date:  Visit date not found   Last Visit Date: 1/20/2022    No results found for: \"LABA1C\"          ( goal A1C is < 7)   BP Readings from Last 3 Encounters:   02/01/18 (!) 180/126   01/09/17 (!) 142/93   10/03/16 (!) 139/96          (goal 120/80)  BUN   Date Value Ref Range Status   01/30/2015 14 6 - 20 mg/dL Final     Creatinine   Date Value Ref Range Status   01/30/2015 0.71 0.70 - 1.20 mg/dL Final     Potassium   Date Value Ref Range Status   01/30/2015 3.7 3.7 - 5.3 mmol/L Final            Yes

## 2024-10-29 ENCOUNTER — TELEPHONE (OUTPATIENT)
Dept: PODIATRY | Age: 49
End: 2024-10-29

## 2024-10-29 NOTE — TELEPHONE ENCOUNTER
Patient's girlfriend Mera called to make an appointment and get a refill for patient's Tramadol rx. She wanted to get him in next week but I gave her the first available which wasn't  until December 5th. Before hanging up she stated \"I guess he will have to make do with mine until then\"

## 2024-12-05 ENCOUNTER — OFFICE VISIT (OUTPATIENT)
Dept: PODIATRY | Age: 49
End: 2024-12-05
Payer: COMMERCIAL

## 2024-12-05 VITALS — WEIGHT: 235 LBS | HEIGHT: 72 IN | BODY MASS INDEX: 31.83 KG/M2

## 2024-12-05 DIAGNOSIS — M25.571 SINUS TARSI SYNDROME OF RIGHT ANKLE: ICD-10-CM

## 2024-12-05 DIAGNOSIS — M72.2 PLANTAR FASCIITIS, BILATERAL: Primary | ICD-10-CM

## 2024-12-05 DIAGNOSIS — M72.2 PLANTAR FASCIAL FIBROMATOSIS OF LEFT FOOT: ICD-10-CM

## 2024-12-05 DIAGNOSIS — M77.50 ENTHESOPATHY OF ANKLE AND TARSUS: ICD-10-CM

## 2024-12-05 DIAGNOSIS — M25.572 SINUS TARSI SYNDROME OF LEFT ANKLE: ICD-10-CM

## 2024-12-05 PROCEDURE — 20605 DRAIN/INJ JOINT/BURSA W/O US: CPT | Performed by: PODIATRIST

## 2024-12-05 PROCEDURE — 99214 OFFICE O/P EST MOD 30 MIN: CPT | Performed by: PODIATRIST

## 2024-12-05 RX ORDER — TRAMADOL HYDROCHLORIDE 50 MG/1
TABLET ORAL
COMMUNITY
Start: 2024-11-26 | End: 2024-12-05 | Stop reason: SDUPTHER

## 2024-12-05 RX ORDER — BUPIVACAINE HYDROCHLORIDE 5 MG/ML
1 INJECTION, SOLUTION PERINEURAL ONCE
Status: COMPLETED | OUTPATIENT
Start: 2024-12-05 | End: 2024-12-05

## 2024-12-05 RX ORDER — DEXAMETHASONE SODIUM PHOSPHATE 4 MG/ML
4 INJECTION, SOLUTION INTRA-ARTICULAR; INTRALESIONAL; INTRAMUSCULAR; INTRAVENOUS; SOFT TISSUE ONCE
Status: COMPLETED | OUTPATIENT
Start: 2024-12-05 | End: 2024-12-05

## 2024-12-05 RX ORDER — MELOXICAM 15 MG/1
15 TABLET ORAL DAILY
Qty: 30 TABLET | Refills: 3 | Status: SHIPPED | OUTPATIENT
Start: 2024-12-05

## 2024-12-05 RX ORDER — TRAMADOL HYDROCHLORIDE 50 MG/1
50 TABLET ORAL EVERY 8 HOURS PRN
Qty: 90 TABLET | Refills: 0 | Status: SHIPPED | OUTPATIENT
Start: 2024-12-05 | End: 2025-01-04

## 2024-12-05 RX ADMIN — BUPIVACAINE HYDROCHLORIDE 5 MG: 5 INJECTION, SOLUTION PERINEURAL at 15:08

## 2024-12-05 RX ADMIN — DEXAMETHASONE SODIUM PHOSPHATE 4 MG: 4 INJECTION, SOLUTION INTRA-ARTICULAR; INTRALESIONAL; INTRAMUSCULAR; INTRAVENOUS; SOFT TISSUE at 15:09

## 2024-12-05 ASSESSMENT — ENCOUNTER SYMPTOMS
DIARRHEA: 0
CONSTIPATION: 0
NAUSEA: 0
BACK PAIN: 1
VOMITING: 0
COLOR CHANGE: 0

## 2024-12-05 NOTE — PROGRESS NOTES
[facility]  Return Patient     Carson Cisneros 49 y.o. male that presents for follow-up of   Chief Complaint   Patient presents with    Foot Pain     B/l foot pain     Patient presents with bilateral ankle and foot pain. I have not seen him on almost 3 years. Most of his pain today is in the ankle, side area, right ankle, not as much in the left ankle.      He is taking Ultram daily to get through work. Not taking any NSAIDs currently. I did refer him to pain management, in the past.     He has a history of severe foot and ankle pain. He used to wear orthotics everyday and he tries to get new boots. He has been icing. No injury. 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 Currently denies F/C/N/V.     No Known Allergies    No past medical history on file.    Prior to Admission medications    Medication Sig Start Date End Date Taking? Authorizing Provider   meloxicam (MOBIC) 15 MG tablet Take 1 tablet by mouth daily 12/5/24  Yes Mirian Munoz DPM   traMADol (ULTRAM) 50 MG tablet Take 1 tablet by mouth every 8 hours as needed for Pain for up to 30 days. Max Daily Amount: 150 mg 12/5/24 1/4/25 Yes Mirian Munoz DPM   naloxone 4 MG/0.1ML LIQD nasal spray 1 spray by Nasal route as needed for Opioid Reversal  Patient not taking: Reported on 1/20/2022 1/14/20   Mirian Munoz DPM       Review of Systems   Constitutional:  Negative for chills, diaphoresis, fatigue, fever and unexpected weight change.   Cardiovascular:  Negative for leg swelling.   Gastrointestinal:  Negative for constipation, diarrhea, nausea and vomiting.   Musculoskeletal:  Positive for back pain. Negative for arthralgias, gait problem and joint swelling.   Skin:  Negative for color change, pallor, rash and wound.   Neurological:  Negative for weakness and numbness.       Lower Extremity Physical Examination:     Vitals:   There were no vitals filed for this visit.  General: AAO x

## 2025-01-15 DIAGNOSIS — M77.50 ENTHESOPATHY OF ANKLE AND TARSUS: ICD-10-CM

## 2025-01-15 DIAGNOSIS — M25.571 SINUS TARSI SYNDROME OF RIGHT ANKLE: ICD-10-CM

## 2025-01-15 DIAGNOSIS — M72.2 PLANTAR FASCIITIS, BILATERAL: ICD-10-CM

## 2025-01-15 DIAGNOSIS — M72.2 PLANTAR FASCIAL FIBROMATOSIS OF LEFT FOOT: ICD-10-CM

## 2025-01-15 DIAGNOSIS — M79.675 CHRONIC PAIN OF TOES OF BOTH FEET: ICD-10-CM

## 2025-01-15 DIAGNOSIS — G89.29 CHRONIC PAIN OF TOES OF BOTH FEET: ICD-10-CM

## 2025-01-15 DIAGNOSIS — M25.572 SINUS TARSI SYNDROME OF LEFT ANKLE: ICD-10-CM

## 2025-01-15 DIAGNOSIS — M79.674 CHRONIC PAIN OF TOES OF BOTH FEET: ICD-10-CM

## 2025-01-16 NOTE — TELEPHONE ENCOUNTER
Please Approve or Refuse.       Next Visit Date:  4/9/2025   Last Visit Date: 12/5/2024    No results found for: \"LABA1C\"          ( goal A1C is < 7)   BP Readings from Last 3 Encounters:   02/01/18 (!) 180/126   01/09/17 (!) 142/93   10/03/16 (!) 139/96          (goal 120/80)  BUN   Date Value Ref Range Status   01/30/2015 14 6 - 20 mg/dL Final     Creatinine   Date Value Ref Range Status   01/30/2015 0.71 0.70 - 1.20 mg/dL Final     Potassium   Date Value Ref Range Status   01/30/2015 3.7 3.7 - 5.3 mmol/L Final

## 2025-01-17 RX ORDER — TRAMADOL HYDROCHLORIDE 50 MG/1
50 TABLET ORAL EVERY 6 HOURS PRN
Qty: 84 TABLET | Refills: 0 | Status: SHIPPED | OUTPATIENT
Start: 2025-01-17 | End: 2025-02-16

## 2025-02-26 ENCOUNTER — TELEPHONE (OUTPATIENT)
Dept: PODIATRY | Age: 50
End: 2025-02-26

## 2025-02-26 DIAGNOSIS — M25.572 SINUS TARSI SYNDROME OF LEFT ANKLE: ICD-10-CM

## 2025-02-26 DIAGNOSIS — M79.675 CHRONIC PAIN OF TOES OF BOTH FEET: ICD-10-CM

## 2025-02-26 DIAGNOSIS — M72.2 PLANTAR FASCIITIS, BILATERAL: Primary | ICD-10-CM

## 2025-02-26 DIAGNOSIS — M79.674 CHRONIC PAIN OF TOES OF BOTH FEET: ICD-10-CM

## 2025-02-26 DIAGNOSIS — M72.2 PLANTAR FASCIAL FIBROMATOSIS OF LEFT FOOT: ICD-10-CM

## 2025-02-26 DIAGNOSIS — G89.29 CHRONIC PAIN OF TOES OF BOTH FEET: ICD-10-CM

## 2025-02-26 DIAGNOSIS — M77.50 ENTHESOPATHY OF ANKLE AND TARSUS: ICD-10-CM

## 2025-02-26 DIAGNOSIS — M25.571 SINUS TARSI SYNDROME OF RIGHT ANKLE: ICD-10-CM

## 2025-02-28 RX ORDER — TRAMADOL HYDROCHLORIDE 50 MG/1
50 TABLET ORAL EVERY 6 HOURS PRN
Qty: 84 TABLET | Refills: 0 | Status: SHIPPED | OUTPATIENT
Start: 2025-02-28 | End: 2025-03-30

## 2025-04-03 DIAGNOSIS — M25.572 SINUS TARSI SYNDROME OF LEFT ANKLE: ICD-10-CM

## 2025-04-03 DIAGNOSIS — M77.50 ENTHESOPATHY OF ANKLE AND TARSUS: ICD-10-CM

## 2025-04-03 DIAGNOSIS — M25.571 SINUS TARSI SYNDROME OF RIGHT ANKLE: ICD-10-CM

## 2025-04-03 DIAGNOSIS — M79.674 CHRONIC PAIN OF TOES OF BOTH FEET: ICD-10-CM

## 2025-04-03 DIAGNOSIS — M72.2 PLANTAR FASCIAL FIBROMATOSIS OF LEFT FOOT: ICD-10-CM

## 2025-04-03 DIAGNOSIS — M79.675 CHRONIC PAIN OF TOES OF BOTH FEET: ICD-10-CM

## 2025-04-03 DIAGNOSIS — M72.2 PLANTAR FASCIITIS, BILATERAL: ICD-10-CM

## 2025-04-03 DIAGNOSIS — G89.29 CHRONIC PAIN OF TOES OF BOTH FEET: ICD-10-CM

## 2025-04-04 RX ORDER — TRAMADOL HYDROCHLORIDE 50 MG/1
50 TABLET ORAL EVERY 6 HOURS PRN
Qty: 84 TABLET | Refills: 0 | Status: SHIPPED | OUTPATIENT
Start: 2025-04-04 | End: 2025-05-04

## 2025-04-09 ENCOUNTER — OFFICE VISIT (OUTPATIENT)
Dept: PODIATRY | Age: 50
End: 2025-04-09
Payer: COMMERCIAL

## 2025-04-09 VITALS — HEIGHT: 72 IN | WEIGHT: 240 LBS | BODY MASS INDEX: 32.51 KG/M2

## 2025-04-09 DIAGNOSIS — M79.674 CHRONIC PAIN OF TOES OF BOTH FEET: ICD-10-CM

## 2025-04-09 DIAGNOSIS — M79.675 CHRONIC PAIN OF TOES OF BOTH FEET: ICD-10-CM

## 2025-04-09 DIAGNOSIS — M25.571 SINUS TARSI SYNDROME OF RIGHT ANKLE: ICD-10-CM

## 2025-04-09 DIAGNOSIS — M77.50 ENTHESOPATHY OF ANKLE AND TARSUS: ICD-10-CM

## 2025-04-09 DIAGNOSIS — G89.29 CHRONIC PAIN OF TOES OF BOTH FEET: ICD-10-CM

## 2025-04-09 DIAGNOSIS — M25.572 SINUS TARSI SYNDROME OF LEFT ANKLE: ICD-10-CM

## 2025-04-09 DIAGNOSIS — M72.2 PLANTAR FASCIITIS, BILATERAL: Primary | ICD-10-CM

## 2025-04-09 DIAGNOSIS — M72.2 PLANTAR FASCIAL FIBROMATOSIS OF LEFT FOOT: ICD-10-CM

## 2025-04-09 PROCEDURE — 99213 OFFICE O/P EST LOW 20 MIN: CPT | Performed by: PODIATRIST

## 2025-04-09 RX ORDER — TRAMADOL HYDROCHLORIDE 50 MG/1
50 TABLET ORAL EVERY 6 HOURS PRN
Qty: 84 TABLET | Refills: 0 | Status: SHIPPED | OUTPATIENT
Start: 2025-04-09 | End: 2025-04-30

## 2025-04-09 RX ORDER — MELOXICAM 15 MG/1
15 TABLET ORAL DAILY
Qty: 30 TABLET | Refills: 3 | Status: SHIPPED | OUTPATIENT
Start: 2025-04-09

## 2025-04-09 ASSESSMENT — ENCOUNTER SYMPTOMS
CONSTIPATION: 0
DIARRHEA: 0
VOMITING: 0
COLOR CHANGE: 0
BACK PAIN: 1
NAUSEA: 0

## 2025-04-09 NOTE — PROGRESS NOTES
[facility]  Return Patient     Carson Cisneros 49 y.o. male that presents for follow-up of   Chief Complaint   Patient presents with    Foot Pain     B/l feet      Patient presents with bilateral ankle and foot pain. I. Most of his pain today is in the ankle, side area, right ankle, not as much in the left ankle.  The last injection only helped for about a week or 2.     He is taking Ultram daily to get through work. Not taking any NSAIDs , Mobiccurrently. I did refer him to pain management, in the past.     He has a history of severe foot and ankle pain. He used to wear orthotics everyday and he tries to get new boots. He has been icing. No injury. 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 Currently denies F/C/N/V.     No Known Allergies    No past medical history on file.    Prior to Admission medications    Medication Sig Start Date End Date Taking? Authorizing Provider   traMADol (ULTRAM) 50 MG tablet Take 1 tablet by mouth every 6 hours as needed for Pain for up to 21 days. Max Daily Amount: 200 mg 4/9/25 4/30/25 Yes Mirian Munoz DPM   meloxicam (MOBIC) 15 MG tablet Take 1 tablet by mouth daily 4/9/25  Yes Mirian Munoz DPM   naloxone 4 MG/0.1ML LIQD nasal spray 1 spray by Nasal route as needed for Opioid Reversal 1/14/20  Yes Mirian Munoz DPM       Review of Systems   Constitutional:  Negative for chills, diaphoresis, fatigue, fever and unexpected weight change.   Cardiovascular:  Negative for leg swelling.   Gastrointestinal:  Negative for constipation, diarrhea, nausea and vomiting.   Musculoskeletal:  Positive for back pain. Negative for arthralgias, gait problem and joint swelling.   Skin:  Negative for color change, pallor, rash and wound.   Neurological:  Negative for weakness and numbness.       Lower Extremity Physical Examination:     Vitals:   There were no vitals filed for this visit.  General: AAO x 3 in NAD.

## 2025-05-29 DIAGNOSIS — M25.572 SINUS TARSI SYNDROME OF LEFT ANKLE: ICD-10-CM

## 2025-05-29 DIAGNOSIS — M79.674 CHRONIC PAIN OF TOES OF BOTH FEET: ICD-10-CM

## 2025-05-29 DIAGNOSIS — M25.571 SINUS TARSI SYNDROME OF RIGHT ANKLE: ICD-10-CM

## 2025-05-29 DIAGNOSIS — M72.2 PLANTAR FASCIITIS, BILATERAL: ICD-10-CM

## 2025-05-29 DIAGNOSIS — M77.50 ENTHESOPATHY OF ANKLE AND TARSUS: ICD-10-CM

## 2025-05-29 DIAGNOSIS — M72.2 PLANTAR FASCIAL FIBROMATOSIS OF LEFT FOOT: ICD-10-CM

## 2025-05-29 DIAGNOSIS — M79.675 CHRONIC PAIN OF TOES OF BOTH FEET: ICD-10-CM

## 2025-05-29 DIAGNOSIS — G89.29 CHRONIC PAIN OF TOES OF BOTH FEET: ICD-10-CM

## 2025-05-29 RX ORDER — TRAMADOL HYDROCHLORIDE 50 MG/1
50 TABLET ORAL EVERY 6 HOURS PRN
Qty: 84 TABLET | Refills: 0 | Status: SHIPPED | OUTPATIENT
Start: 2025-05-29 | End: 2025-06-28

## 2025-05-29 NOTE — TELEPHONE ENCOUNTER
Please Approve or Refuse.  Send to Pharmacy per Pt's Request: [facility]     Next Visit Date:  8/6/2025   Last Visit Date: 4/9/2025    No results found for: \"LABA1C\"          ( goal A1C is < 7)   BP Readings from Last 3 Encounters:   02/01/18 (!) 180/126   01/09/17 (!) 142/93   10/03/16 (!) 139/96          (goal 120/80)  BUN   Date Value Ref Range Status   01/30/2015 14 6 - 20 mg/dL Final     Creatinine   Date Value Ref Range Status   01/30/2015 0.71 0.70 - 1.20 mg/dL Final     Potassium   Date Value Ref Range Status   01/30/2015 3.7 3.7 - 5.3 mmol/L Final

## 2025-06-02 ENCOUNTER — TELEPHONE (OUTPATIENT)
Dept: PODIATRY | Age: 50
End: 2025-06-02

## 2025-06-02 DIAGNOSIS — M72.2 PLANTAR FASCIAL FIBROMATOSIS OF LEFT FOOT: ICD-10-CM

## 2025-06-02 DIAGNOSIS — M25.572 SINUS TARSI SYNDROME OF LEFT ANKLE: ICD-10-CM

## 2025-06-02 DIAGNOSIS — M79.674 CHRONIC PAIN OF TOES OF BOTH FEET: ICD-10-CM

## 2025-06-02 DIAGNOSIS — G89.29 CHRONIC PAIN OF TOES OF BOTH FEET: ICD-10-CM

## 2025-06-02 DIAGNOSIS — M72.2 PLANTAR FASCIITIS, BILATERAL: ICD-10-CM

## 2025-06-02 DIAGNOSIS — M25.571 SINUS TARSI SYNDROME OF RIGHT ANKLE: ICD-10-CM

## 2025-06-02 DIAGNOSIS — M79.675 CHRONIC PAIN OF TOES OF BOTH FEET: ICD-10-CM

## 2025-06-02 DIAGNOSIS — M77.50 ENTHESOPATHY OF ANKLE AND TARSUS: ICD-10-CM

## 2025-06-02 RX ORDER — TRAMADOL HYDROCHLORIDE 50 MG/1
50 TABLET ORAL EVERY 6 HOURS PRN
Qty: 84 TABLET | Refills: 0 | Status: SHIPPED | OUTPATIENT
Start: 2025-06-02 | End: 2025-06-02 | Stop reason: ALTCHOICE

## 2025-06-02 RX ORDER — TRAMADOL HYDROCHLORIDE 50 MG/1
50 TABLET ORAL EVERY 6 HOURS PRN
Qty: 84 TABLET | Refills: 0 | Status: SHIPPED | OUTPATIENT
Start: 2025-06-02 | End: 2025-06-23

## 2025-06-02 NOTE — TELEPHONE ENCOUNTER
Patients insurance will not cover Tramadol because of how it was written. It needs to say 21 day supply and currently says 30 day supply.

## 2025-06-02 NOTE — TELEPHONE ENCOUNTER
Prescription was sent to [facility]. Patient would like it to go to [facility] instead

## 2025-06-23 DIAGNOSIS — G89.29 CHRONIC PAIN OF TOES OF BOTH FEET: ICD-10-CM

## 2025-06-23 DIAGNOSIS — M25.572 SINUS TARSI SYNDROME OF LEFT ANKLE: ICD-10-CM

## 2025-06-23 DIAGNOSIS — M25.571 SINUS TARSI SYNDROME OF RIGHT ANKLE: ICD-10-CM

## 2025-06-23 DIAGNOSIS — M72.2 PLANTAR FASCIAL FIBROMATOSIS OF LEFT FOOT: ICD-10-CM

## 2025-06-23 DIAGNOSIS — M77.50 ENTHESOPATHY OF ANKLE AND TARSUS: ICD-10-CM

## 2025-06-23 DIAGNOSIS — M72.2 PLANTAR FASCIITIS, BILATERAL: ICD-10-CM

## 2025-06-23 DIAGNOSIS — M79.675 CHRONIC PAIN OF TOES OF BOTH FEET: ICD-10-CM

## 2025-06-23 DIAGNOSIS — M79.674 CHRONIC PAIN OF TOES OF BOTH FEET: ICD-10-CM

## 2025-06-24 NOTE — TELEPHONE ENCOUNTER
Please Approve or Refuse.       Next Visit Date:  8/6/2025   Last Visit Date: 4/9/2025    No results found for: \"LABA1C\"          ( goal A1C is < 7)   BP Readings from Last 3 Encounters:   02/01/18 (!) 180/126   01/09/17 (!) 142/93   10/03/16 (!) 139/96          (goal 120/80)  BUN   Date Value Ref Range Status   01/30/2015 14 6 - 20 mg/dL Final     Creatinine   Date Value Ref Range Status   01/30/2015 0.71 0.70 - 1.20 mg/dL Final     Potassium   Date Value Ref Range Status   01/30/2015 3.7 3.7 - 5.3 mmol/L Final

## 2025-06-25 RX ORDER — TRAMADOL HYDROCHLORIDE 50 MG/1
50 TABLET ORAL EVERY 6 HOURS PRN
Qty: 84 TABLET | Refills: 0 | Status: SHIPPED | OUTPATIENT
Start: 2025-06-25 | End: 2025-07-16

## 2025-07-21 DIAGNOSIS — M72.2 PLANTAR FASCIITIS, BILATERAL: ICD-10-CM

## 2025-07-21 DIAGNOSIS — M25.571 SINUS TARSI SYNDROME OF RIGHT ANKLE: ICD-10-CM

## 2025-07-21 DIAGNOSIS — M79.674 CHRONIC PAIN OF TOES OF BOTH FEET: ICD-10-CM

## 2025-07-21 DIAGNOSIS — M72.2 PLANTAR FASCIAL FIBROMATOSIS OF LEFT FOOT: ICD-10-CM

## 2025-07-21 DIAGNOSIS — M79.675 CHRONIC PAIN OF TOES OF BOTH FEET: ICD-10-CM

## 2025-07-21 DIAGNOSIS — M25.572 SINUS TARSI SYNDROME OF LEFT ANKLE: ICD-10-CM

## 2025-07-21 DIAGNOSIS — M77.50 ENTHESOPATHY OF ANKLE AND TARSUS: ICD-10-CM

## 2025-07-21 DIAGNOSIS — G89.29 CHRONIC PAIN OF TOES OF BOTH FEET: ICD-10-CM

## 2025-07-23 RX ORDER — TRAMADOL HYDROCHLORIDE 50 MG/1
50 TABLET ORAL EVERY 6 HOURS PRN
Qty: 84 TABLET | Refills: 0 | Status: SHIPPED | OUTPATIENT
Start: 2025-07-23 | End: 2025-08-22

## 2025-08-06 ENCOUNTER — OFFICE VISIT (OUTPATIENT)
Dept: PODIATRY | Age: 50
End: 2025-08-06
Payer: COMMERCIAL

## 2025-08-06 VITALS — HEIGHT: 73 IN | WEIGHT: 240 LBS | BODY MASS INDEX: 31.81 KG/M2

## 2025-08-06 DIAGNOSIS — M79.675 CHRONIC PAIN OF TOES OF BOTH FEET: ICD-10-CM

## 2025-08-06 DIAGNOSIS — M72.2 PLANTAR FASCIAL FIBROMATOSIS OF LEFT FOOT: ICD-10-CM

## 2025-08-06 DIAGNOSIS — M25.572 SINUS TARSI SYNDROME OF LEFT ANKLE: ICD-10-CM

## 2025-08-06 DIAGNOSIS — G89.29 CHRONIC PAIN OF TOES OF BOTH FEET: ICD-10-CM

## 2025-08-06 DIAGNOSIS — M25.571 SINUS TARSI SYNDROME OF RIGHT ANKLE: ICD-10-CM

## 2025-08-06 DIAGNOSIS — M72.2 PLANTAR FASCIITIS, BILATERAL: Primary | ICD-10-CM

## 2025-08-06 DIAGNOSIS — M79.674 CHRONIC PAIN OF TOES OF BOTH FEET: ICD-10-CM

## 2025-08-06 DIAGNOSIS — M77.50 ENTHESOPATHY OF ANKLE AND TARSUS: ICD-10-CM

## 2025-08-06 PROCEDURE — 99213 OFFICE O/P EST LOW 20 MIN: CPT | Performed by: PODIATRIST

## 2025-08-06 ASSESSMENT — ENCOUNTER SYMPTOMS
BACK PAIN: 1
COLOR CHANGE: 0
VOMITING: 0
CONSTIPATION: 0
DIARRHEA: 0
NAUSEA: 0

## 2025-08-25 DIAGNOSIS — M72.2 PLANTAR FASCIITIS, BILATERAL: ICD-10-CM

## 2025-08-25 DIAGNOSIS — M72.2 PLANTAR FASCIAL FIBROMATOSIS OF LEFT FOOT: ICD-10-CM

## 2025-08-25 DIAGNOSIS — M79.674 CHRONIC PAIN OF TOES OF BOTH FEET: ICD-10-CM

## 2025-08-25 DIAGNOSIS — M25.572 SINUS TARSI SYNDROME OF LEFT ANKLE: ICD-10-CM

## 2025-08-25 DIAGNOSIS — M79.675 CHRONIC PAIN OF TOES OF BOTH FEET: ICD-10-CM

## 2025-08-25 DIAGNOSIS — M77.50 ENTHESOPATHY OF ANKLE AND TARSUS: ICD-10-CM

## 2025-08-25 DIAGNOSIS — G89.29 CHRONIC PAIN OF TOES OF BOTH FEET: ICD-10-CM

## 2025-08-25 DIAGNOSIS — M25.571 SINUS TARSI SYNDROME OF RIGHT ANKLE: ICD-10-CM

## 2025-08-26 RX ORDER — TRAMADOL HYDROCHLORIDE 50 MG/1
50 TABLET ORAL EVERY 6 HOURS PRN
Qty: 84 TABLET | Refills: 0 | Status: SHIPPED | OUTPATIENT
Start: 2025-08-26 | End: 2025-09-16